# Patient Record
Sex: FEMALE | Race: WHITE | URBAN - METROPOLITAN AREA
[De-identification: names, ages, dates, MRNs, and addresses within clinical notes are randomized per-mention and may not be internally consistent; named-entity substitution may affect disease eponyms.]

---

## 2020-06-03 ENCOUNTER — OFFICE VISIT (OUTPATIENT)
Dept: URBAN - METROPOLITAN AREA CLINIC 98 | Facility: CLINIC | Age: 50
End: 2020-06-03
Payer: MEDICARE

## 2020-06-03 DIAGNOSIS — R10.84 ABDOMINAL PAIN, CHRONIC, GENERALIZED: ICD-10-CM

## 2020-06-03 DIAGNOSIS — K21.9 GERD (GASTROESOPHAGEAL REFLUX DISEASE): ICD-10-CM

## 2020-06-03 PROCEDURE — 99214 OFFICE O/P EST MOD 30 MIN: CPT | Performed by: INTERNAL MEDICINE

## 2020-06-03 PROCEDURE — G9903 PT SCRN TBCO ID AS NON USER: HCPCS | Performed by: INTERNAL MEDICINE

## 2020-06-03 PROCEDURE — G8420 CALC BMI NORM PARAMETERS: HCPCS | Performed by: INTERNAL MEDICINE

## 2020-06-03 PROCEDURE — 1036F TOBACCO NON-USER: CPT | Performed by: INTERNAL MEDICINE

## 2020-06-03 PROCEDURE — G8427 DOCREV CUR MEDS BY ELIG CLIN: HCPCS | Performed by: INTERNAL MEDICINE

## 2020-06-03 RX ORDER — ESOMEPRAZOLE MAGNESIUM 40 MG
CAPSULE,DELAYED RELEASE (ENTERIC COATED) ORAL
Qty: 0 | Refills: 0 | Status: ACTIVE | COMMUNITY
Start: 1900-01-01 | End: 1900-01-01

## 2020-06-03 RX ORDER — TIZANIDINE 4 MG/1
TAKE 1 TABLET BY MOUTH FOUR TIMES A DAY TABLET ORAL
Qty: 100 | Refills: 3 | Status: ACTIVE | COMMUNITY
Start: 2019-06-27 | End: 1900-01-01

## 2020-06-03 RX ORDER — TRAZODONE HYDROCHLORIDE 50 MG/1
TAKE 1 TABLET BY MOUTH AT BEDTIME TABLET ORAL
Qty: 30 | Refills: 2 | Status: ACTIVE | COMMUNITY
Start: 2019-06-27 | End: 1900-01-01

## 2020-06-03 RX ORDER — PROMETHAZINE HYDROCHLORIDE 50 MG/1
TAKE 1 TABLET BY MOUTH THREE TIMES A DAY TABLET ORAL
Qty: 90 | Refills: 4 | Status: ACTIVE | COMMUNITY
Start: 2018-12-16 | End: 1900-01-01

## 2020-06-03 RX ORDER — OXYCODONE HYDROCHLORIDE AND ACETAMINOPHEN 10; 325 MG/1; MG/1
TAKE 1 TABLET BY ORAL ROUTE EVERY 8 HOURS AS NEEDED TABLET ORAL
Qty: 40 | Refills: 0 | Status: ACTIVE | COMMUNITY
Start: 1900-01-01 | End: 1900-01-01

## 2020-06-03 RX ORDER — TIZANIDINE HYDROCHLORIDE 4 MG/1
TAKE 1 CAPSULE (4 MG) BY ORAL ROUTE TID PRN CAPSULE ORAL
Qty: 100 | Refills: 0 | Status: ACTIVE | COMMUNITY
Start: 1900-01-01 | End: 1900-01-01

## 2020-06-03 RX ORDER — IBUPROFEN 800 MG/1
TAKE 1 TABLET (800 MG) BY ORAL ROUTE 3 TIMES PER DAY WITH FOOD TABLET ORAL
Qty: 0 | Refills: 0 | Status: ACTIVE | COMMUNITY
Start: 1900-01-01 | End: 1900-01-01

## 2020-06-03 RX ORDER — EXTENDED PHENYTOIN SODIUM 30 MG/1
CAPSULE ORAL
Qty: 0 | Refills: 0 | Status: ACTIVE | COMMUNITY
Start: 1900-01-01 | End: 1900-01-01

## 2020-06-03 RX ORDER — ALPRAZOLAM 2 MG/1
1 PO TID PRN TABLET ORAL
Qty: 90 | Refills: 3 | Status: ACTIVE | COMMUNITY
Start: 1900-01-01 | End: 1900-01-01

## 2020-06-03 RX ORDER — ONDANSETRON HCL 8 MG
TAKE 1 TABLET BY ORAL ROUTE EVERY 6 HOURS TABLET ORAL
Qty: 20 | Refills: 0 | Status: ACTIVE | COMMUNITY
Start: 2019-07-03 | End: 1900-01-01

## 2020-06-03 NOTE — PHYSICAL EXAM GASTROINTESTINAL
Abdomen , softly distended, diffusely tender, no guarding or rigidity , no masses palpable , normal bowel sounds , Liver and Spleen no hepatosplenomegaly , liver nontender , spleen not palpable

## 2020-06-03 NOTE — PHYSICAL EXAM CHEST:
no lesions,  no deformities,  no traumatic injuries,  no significant scars are present,  chest wall non-tender,  no masses present,  tactile fremitus is normal, breathing is unlabored without accessory muscle use,normal breath sounds
no

## 2020-06-07 PROBLEM — 102614006 GENERALIZED ABDOMINAL PAIN: Status: ACTIVE | Noted: 2020-06-07

## 2020-06-26 ENCOUNTER — LAB OUTSIDE AN ENCOUNTER (OUTPATIENT)
Dept: URBAN - METROPOLITAN AREA CLINIC 98 | Facility: CLINIC | Age: 50
End: 2020-06-26

## 2020-08-07 ENCOUNTER — TELEPHONE ENCOUNTER (OUTPATIENT)
Dept: URBAN - METROPOLITAN AREA CLINIC 98 | Facility: CLINIC | Age: 50
End: 2020-08-07

## 2020-08-12 ENCOUNTER — OFFICE VISIT (OUTPATIENT)
Dept: URBAN - METROPOLITAN AREA CLINIC 98 | Facility: CLINIC | Age: 50
End: 2020-08-12
Payer: MEDICARE

## 2020-08-12 DIAGNOSIS — R10.9 ABDOMINAL PAIN OF MULTIPLE SITES: ICD-10-CM

## 2020-08-12 DIAGNOSIS — M54.9 CHRONIC BACK PAIN: ICD-10-CM

## 2020-08-12 DIAGNOSIS — K21.0 GASTROESOPHAGEAL REFLUX DISEASE (GERD): ICD-10-CM

## 2020-08-12 PROCEDURE — 1036F TOBACCO NON-USER: CPT | Performed by: INTERNAL MEDICINE

## 2020-08-12 PROCEDURE — G8427 DOCREV CUR MEDS BY ELIG CLIN: HCPCS | Performed by: INTERNAL MEDICINE

## 2020-08-12 PROCEDURE — 99214 OFFICE O/P EST MOD 30 MIN: CPT | Performed by: INTERNAL MEDICINE

## 2020-08-12 PROCEDURE — G9903 PT SCRN TBCO ID AS NON USER: HCPCS | Performed by: INTERNAL MEDICINE

## 2020-08-12 PROCEDURE — G8420 CALC BMI NORM PARAMETERS: HCPCS | Performed by: INTERNAL MEDICINE

## 2020-08-12 RX ORDER — TIZANIDINE HYDROCHLORIDE 4 MG/1
TAKE 1 CAPSULE (4 MG) BY ORAL ROUTE TID PRN CAPSULE ORAL
Qty: 100 | Refills: 0 | Status: ACTIVE | COMMUNITY
Start: 1900-01-01

## 2020-08-12 RX ORDER — ESOMEPRAZOLE MAGNESIUM 40 MG
CAPSULE,DELAYED RELEASE (ENTERIC COATED) ORAL
Qty: 0 | Refills: 0 | Status: ACTIVE | COMMUNITY
Start: 1900-01-01

## 2020-08-12 RX ORDER — PROMETHAZINE HYDROCHLORIDE 50 MG/1
TAKE 1 TABLET BY MOUTH THREE TIMES A DAY TABLET ORAL
Qty: 90 | Refills: 4 | Status: ACTIVE | COMMUNITY
Start: 2018-12-16

## 2020-08-12 RX ORDER — TRAZODONE HYDROCHLORIDE 50 MG/1
TAKE 1 TABLET BY MOUTH AT BEDTIME TABLET ORAL
Qty: 30 | Refills: 2 | Status: ACTIVE | COMMUNITY
Start: 2019-06-27

## 2020-08-12 RX ORDER — TIZANIDINE 4 MG/1
TAKE 1 TABLET BY MOUTH FOUR TIMES A DAY TABLET ORAL
Qty: 100 | Refills: 3 | Status: ACTIVE | COMMUNITY
Start: 2019-06-27

## 2020-08-12 RX ORDER — OXYCODONE HYDROCHLORIDE AND ACETAMINOPHEN 10; 325 MG/1; MG/1
TAKE 1 TABLET BY ORAL ROUTE EVERY 8 HOURS AS NEEDED TABLET ORAL
Qty: 40 | Refills: 0 | Status: ACTIVE | COMMUNITY
Start: 1900-01-01

## 2020-08-12 RX ORDER — ALPRAZOLAM 2 MG/1
1 PO TID PRN TABLET ORAL
Qty: 90 | Refills: 3 | Status: ACTIVE | COMMUNITY
Start: 1900-01-01

## 2020-08-12 RX ORDER — EXTENDED PHENYTOIN SODIUM 30 MG/1
CAPSULE ORAL
Qty: 0 | Refills: 0 | Status: ACTIVE | COMMUNITY
Start: 1900-01-01

## 2020-08-12 RX ORDER — ONDANSETRON HCL 8 MG
TAKE 1 TABLET BY ORAL ROUTE EVERY 6 HOURS TABLET ORAL
Qty: 20 | Refills: 0 | Status: ACTIVE | COMMUNITY
Start: 2019-07-03

## 2020-08-12 RX ORDER — IBUPROFEN 800 MG/1
TAKE 1 TABLET (800 MG) BY ORAL ROUTE 3 TIMES PER DAY WITH FOOD TABLET ORAL
Qty: 0 | Refills: 0 | Status: ACTIVE | COMMUNITY
Start: 1900-01-01

## 2020-08-12 NOTE — HPI-TODAY'S VISIT:
48 yo pt here for abdominal pain follow up. Still w/ rather constant p-prandial upper abdominal pain and distention despite eating small meals. feels nauseous w/o emesis. Intermittent WALE sxs controlled w/ diet and PPI. Normal bm's and no melenic stools nor hematochezia. At times, her abdominal pain seems to be localized in the RUQ. She has had no fever, chills nor cardiorespiratory sxs. Despite being compliant w/ diet and Rx, she has continued to experinece abdominal pain w/ distention No weight loss nor anorexia. Recent Abdominal US was normal. No other complaints after extensive ROS. She has been on chronic analgesics. She has chronic back pain due to extensive back surgeries with marked thoracolumbar lordosis - kyphosis. She has been compliant w/ 5 small meals a day and Rx. EGD 4/19: NERD, acute erosive Hp-negative alkaline reflux gastritis, WALE w/o BE and normal duodenal bx's.

## 2020-08-14 ENCOUNTER — TELEPHONE ENCOUNTER (OUTPATIENT)
Dept: URBAN - METROPOLITAN AREA CLINIC 98 | Facility: CLINIC | Age: 50
End: 2020-08-14

## 2020-08-21 ENCOUNTER — TELEPHONE ENCOUNTER (OUTPATIENT)
Dept: URBAN - METROPOLITAN AREA CLINIC 98 | Facility: CLINIC | Age: 50
End: 2020-08-21

## 2020-09-01 ENCOUNTER — LAB OUTSIDE AN ENCOUNTER (OUTPATIENT)
Dept: URBAN - METROPOLITAN AREA CLINIC 98 | Facility: CLINIC | Age: 50
End: 2020-09-01

## 2020-09-04 ENCOUNTER — TELEPHONE ENCOUNTER (OUTPATIENT)
Dept: URBAN - METROPOLITAN AREA CLINIC 98 | Facility: CLINIC | Age: 50
End: 2020-09-04

## 2020-10-05 ENCOUNTER — TELEPHONE ENCOUNTER (OUTPATIENT)
Dept: URBAN - METROPOLITAN AREA CLINIC 98 | Facility: CLINIC | Age: 50
End: 2020-10-05

## 2020-10-19 ENCOUNTER — TELEPHONE ENCOUNTER (OUTPATIENT)
Dept: URBAN - METROPOLITAN AREA CLINIC 98 | Facility: CLINIC | Age: 50
End: 2020-10-19

## 2020-10-19 RX ORDER — ALPRAZOLAM 1 MG/1
2 TABLET TABLET ORAL THREE TIMES A DAY
Qty: 180 TABLET | Refills: 0 | OUTPATIENT
Start: 2020-10-19

## 2020-11-02 ENCOUNTER — TELEPHONE ENCOUNTER (OUTPATIENT)
Dept: URBAN - METROPOLITAN AREA CLINIC 98 | Facility: CLINIC | Age: 50
End: 2020-11-02

## 2020-11-06 ENCOUNTER — OFFICE VISIT (OUTPATIENT)
Dept: URBAN - METROPOLITAN AREA TELEHEALTH 2 | Facility: TELEHEALTH | Age: 50
End: 2020-11-06
Payer: MEDICARE

## 2020-11-06 DIAGNOSIS — R10.84 ABDOMINAL CRAMPING, GENERALIZED: ICD-10-CM

## 2020-11-06 DIAGNOSIS — M54.9 CHRONIC BACK PAIN GREATER THAN 3 MONTHS DURATION: ICD-10-CM

## 2020-11-06 DIAGNOSIS — K21.9 GERD (GASTROESOPHAGEAL REFLUX DISEASE): ICD-10-CM

## 2020-11-06 PROCEDURE — G8420 CALC BMI NORM PARAMETERS: HCPCS | Performed by: INTERNAL MEDICINE

## 2020-11-06 PROCEDURE — 99213 OFFICE O/P EST LOW 20 MIN: CPT | Performed by: INTERNAL MEDICINE

## 2020-11-06 PROCEDURE — G8427 DOCREV CUR MEDS BY ELIG CLIN: HCPCS | Performed by: INTERNAL MEDICINE

## 2020-11-06 PROCEDURE — G8484 FLU IMMUNIZE NO ADMIN: HCPCS | Performed by: INTERNAL MEDICINE

## 2020-11-06 PROCEDURE — 1036F TOBACCO NON-USER: CPT | Performed by: INTERNAL MEDICINE

## 2020-11-06 PROCEDURE — G9903 PT SCRN TBCO ID AS NON USER: HCPCS | Performed by: INTERNAL MEDICINE

## 2020-11-06 RX ORDER — EXTENDED PHENYTOIN SODIUM 30 MG/1
CAPSULE ORAL
Qty: 0 | Refills: 0 | Status: ACTIVE | COMMUNITY
Start: 1900-01-01

## 2020-11-06 RX ORDER — PROMETHAZINE HYDROCHLORIDE 50 MG/1
TAKE 1 TABLET BY MOUTH THREE TIMES A DAY TABLET ORAL
Qty: 90 | Refills: 4 | Status: ACTIVE | COMMUNITY
Start: 2018-12-16

## 2020-11-06 RX ORDER — TIZANIDINE 4 MG/1
TAKE 1 TABLET BY MOUTH FOUR TIMES A DAY TABLET ORAL
Qty: 100 | Refills: 3 | Status: ACTIVE | COMMUNITY
Start: 2019-06-27

## 2020-11-06 RX ORDER — TIZANIDINE HYDROCHLORIDE 4 MG/1
TAKE 1 CAPSULE (4 MG) BY ORAL ROUTE TID PRN CAPSULE ORAL
Qty: 100 | Refills: 0 | Status: ACTIVE | COMMUNITY
Start: 1900-01-01

## 2020-11-06 RX ORDER — OXYCODONE HYDROCHLORIDE AND ACETAMINOPHEN 10; 325 MG/1; MG/1
TAKE 1 TABLET BY ORAL ROUTE EVERY 8 HOURS AS NEEDED TABLET ORAL
Qty: 40 | Refills: 0 | Status: ACTIVE | COMMUNITY
Start: 1900-01-01

## 2020-11-06 RX ORDER — ESOMEPRAZOLE MAGNESIUM 40 MG
CAPSULE,DELAYED RELEASE (ENTERIC COATED) ORAL
Qty: 0 | Refills: 0 | Status: ACTIVE | COMMUNITY
Start: 1900-01-01

## 2020-11-06 RX ORDER — TRAZODONE HYDROCHLORIDE 50 MG/1
TAKE 1 TABLET BY MOUTH AT BEDTIME TABLET ORAL
Qty: 30 | Refills: 2 | Status: ACTIVE | COMMUNITY
Start: 2019-06-27

## 2020-11-06 RX ORDER — ALPRAZOLAM 1 MG/1
2 TABLET TABLET ORAL THREE TIMES A DAY
Qty: 180 TABLET | Refills: 0 | Status: ACTIVE | COMMUNITY
Start: 2020-10-19

## 2020-11-06 RX ORDER — ONDANSETRON HCL 8 MG
TAKE 1 TABLET BY ORAL ROUTE EVERY 6 HOURS TABLET ORAL
Qty: 20 | Refills: 0 | Status: ACTIVE | COMMUNITY
Start: 2019-07-03

## 2020-11-06 RX ORDER — IBUPROFEN 800 MG/1
TAKE 1 TABLET (800 MG) BY ORAL ROUTE 3 TIMES PER DAY WITH FOOD TABLET ORAL
Qty: 0 | Refills: 0 | Status: ACTIVE | COMMUNITY
Start: 1900-01-01

## 2020-11-06 RX ORDER — ALPRAZOLAM 2 MG/1
1 PO TID PRN TABLET ORAL
Qty: 90 | Refills: 3 | Status: ACTIVE | COMMUNITY
Start: 1900-01-01

## 2020-11-06 NOTE — HPI-TODAY'S VISIT:
This is a Telephone OV to which patient has agreed to. 48 yo patient for abdominal pain f/u. She has been experiencing constant p-prandial abdominal fullness and early satiety w/ constant nausea w/o emesis. Unable to eat much due to her abdominal pain. She has normal bm's w/o melenic stools nor hematochezia. She has chronic back pain due to multiple back surgeries and marked TS kyphosis and lumbar scoliosis. Has been seen by Ortho and suggested a back brace; not a candidate for additional surgeries. She has been feeling fatigued and tired due to her chronic pain. No fever or chills. No cardiorespiratory sxs. Recent labs all normal. Normal A + P CT scan and CXR. No other complaints.

## 2020-11-07 PROBLEM — 235595009 GASTROESOPHAGEAL REFLUX DISEASE: Status: ACTIVE | Noted: 2020-11-07

## 2020-11-30 ENCOUNTER — TELEPHONE ENCOUNTER (OUTPATIENT)
Dept: URBAN - METROPOLITAN AREA CLINIC 98 | Facility: CLINIC | Age: 50
End: 2020-11-30

## 2020-12-29 ENCOUNTER — TELEPHONE ENCOUNTER (OUTPATIENT)
Dept: URBAN - METROPOLITAN AREA CLINIC 98 | Facility: CLINIC | Age: 50
End: 2020-12-29

## 2021-03-02 ENCOUNTER — ERX REFILL RESPONSE (OUTPATIENT)
Dept: URBAN - METROPOLITAN AREA CLINIC 98 | Facility: CLINIC | Age: 51
End: 2021-03-02

## 2021-03-02 RX ORDER — TIZANIDINE 4 MG/1
TAKE 1 TABLET BY MOUTH FOUR TIMES DAILY TABLET ORAL
Qty: 100 | Refills: 0

## 2021-05-04 ENCOUNTER — TELEPHONE ENCOUNTER (OUTPATIENT)
Dept: URBAN - METROPOLITAN AREA SURGERY CENTER 30 | Facility: SURGERY CENTER | Age: 51
End: 2021-05-04

## 2021-05-11 ENCOUNTER — TELEPHONE ENCOUNTER (OUTPATIENT)
Dept: URBAN - METROPOLITAN AREA CLINIC 98 | Facility: CLINIC | Age: 51
End: 2021-05-11

## 2021-05-18 ENCOUNTER — OFFICE VISIT (OUTPATIENT)
Dept: URBAN - METROPOLITAN AREA CLINIC 98 | Facility: CLINIC | Age: 51
End: 2021-05-18
Payer: MEDICARE

## 2021-05-18 DIAGNOSIS — R10.13 EPIGASTRIC PAIN: ICD-10-CM

## 2021-05-18 DIAGNOSIS — R13.14 PHARYNGOESOPHAGEAL DYSPHAGIA: ICD-10-CM

## 2021-05-18 PROCEDURE — 99214 OFFICE O/P EST MOD 30 MIN: CPT | Performed by: INTERNAL MEDICINE

## 2021-05-18 RX ORDER — ONDANSETRON HCL 8 MG
TAKE 1 TABLET BY ORAL ROUTE EVERY 6 HOURS TABLET ORAL
Qty: 20 | Refills: 0 | Status: ACTIVE | COMMUNITY
Start: 2019-07-03

## 2021-05-18 RX ORDER — OXYCODONE HYDROCHLORIDE AND ACETAMINOPHEN 10; 325 MG/1; MG/1
TAKE 1 TABLET BY ORAL ROUTE EVERY 8 HOURS AS NEEDED TABLET ORAL
Qty: 40 | Refills: 0 | Status: ACTIVE | COMMUNITY
Start: 1900-01-01

## 2021-05-18 RX ORDER — TRAZODONE HYDROCHLORIDE 50 MG/1
TAKE 1 TABLET BY MOUTH AT BEDTIME TABLET ORAL
Qty: 30 | Refills: 2 | Status: ACTIVE | COMMUNITY
Start: 2019-06-27

## 2021-05-18 RX ORDER — TIZANIDINE 4 MG/1
TAKE 1 TABLET BY MOUTH FOUR TIMES DAILY TABLET ORAL
Qty: 100 | Refills: 0 | Status: ACTIVE | COMMUNITY

## 2021-05-18 RX ORDER — ALPRAZOLAM 1 MG/1
2 TABLET TABLET ORAL THREE TIMES A DAY
Qty: 180 TABLET | Refills: 0 | Status: ACTIVE | COMMUNITY
Start: 2020-10-19

## 2021-05-18 RX ORDER — TIZANIDINE HYDROCHLORIDE 4 MG/1
TAKE 1 CAPSULE (4 MG) BY ORAL ROUTE TID PRN CAPSULE ORAL
Qty: 100 | Refills: 0 | Status: ACTIVE | COMMUNITY
Start: 1900-01-01

## 2021-05-18 RX ORDER — IBUPROFEN 800 MG/1
TAKE 1 TABLET (800 MG) BY ORAL ROUTE 3 TIMES PER DAY WITH FOOD TABLET ORAL
Qty: 0 | Refills: 0 | Status: ACTIVE | COMMUNITY
Start: 1900-01-01

## 2021-05-18 RX ORDER — PROMETHAZINE HYDROCHLORIDE 50 MG/1
TAKE 1 TABLET BY MOUTH THREE TIMES A DAY TABLET ORAL
Qty: 90 | Refills: 4 | Status: ACTIVE | COMMUNITY
Start: 2018-12-16

## 2021-05-18 RX ORDER — ESOMEPRAZOLE MAGNESIUM 40 MG
CAPSULE,DELAYED RELEASE (ENTERIC COATED) ORAL
Qty: 0 | Refills: 0 | Status: ACTIVE | COMMUNITY
Start: 1900-01-01

## 2021-05-18 RX ORDER — EXTENDED PHENYTOIN SODIUM 30 MG/1
CAPSULE ORAL
Qty: 0 | Refills: 0 | Status: ACTIVE | COMMUNITY
Start: 1900-01-01

## 2021-05-18 RX ORDER — ALPRAZOLAM 2 MG/1
1 PO TID PRN TABLET ORAL
Qty: 90 | Refills: 3 | Status: ACTIVE | COMMUNITY
Start: 1900-01-01

## 2021-05-18 NOTE — HPI-TODAY'S VISIT:
50 yo patient for abdominal pain f/u. She has been experiencing constant p-prandial abdominal fullness and early satiety w/ constant nausea w/o emesis. Unable to eat much due to her abdominal pain. She has been having some intermittent dysphagia to solids w/o food impaction. She has normal bm's w/o melenic stools nor hematochezia. She has chronic back pain due to multiple back surgeries and marked TS kyphosis and lumbar scoliosis. Has been seen by Ortho and suggested a back brace; not a candidate for additional surgeries. She has been feeling fatigued and tired due to her chronic pain. No fever or chills. No cardiorespiratory sxs. Recent labs all normal. Normal A + P CT scan and CXR. No other complaints.

## 2021-06-17 ENCOUNTER — OFFICE VISIT (OUTPATIENT)
Dept: URBAN - METROPOLITAN AREA SURGERY CENTER 18 | Facility: SURGERY CENTER | Age: 51
End: 2021-06-17

## 2021-06-17 ENCOUNTER — TELEPHONE ENCOUNTER (OUTPATIENT)
Dept: URBAN - METROPOLITAN AREA CLINIC 98 | Facility: CLINIC | Age: 51
End: 2021-06-17

## 2021-06-17 RX ORDER — TIZANIDINE 4 MG/1
TAKE 1 TABLET BY MOUTH FOUR TIMES DAILY TABLET ORAL
Qty: 100 | Refills: 0

## 2021-06-18 ENCOUNTER — ERX REFILL RESPONSE (OUTPATIENT)
Dept: URBAN - METROPOLITAN AREA CLINIC 98 | Facility: CLINIC | Age: 51
End: 2021-06-18

## 2021-06-18 RX ORDER — TIZANIDINE 4 MG/1
TAKE 1 TABLET BY MOUTH FOUR TIMES DAILY TABLET ORAL
Qty: 100 | Refills: 0

## 2021-07-12 ENCOUNTER — TELEPHONE ENCOUNTER (OUTPATIENT)
Dept: URBAN - METROPOLITAN AREA CLINIC 98 | Facility: CLINIC | Age: 51
End: 2021-07-12

## 2021-07-12 RX ORDER — TRAZODONE HYDROCHLORIDE 50 MG/1
1 TABLET AT BEDTIME AS NEEDED TABLET, FILM COATED ORAL THREE TIMES A DAY
Qty: 90 TABLET | Refills: 3 | OUTPATIENT
Start: 2021-07-26

## 2021-07-12 RX ORDER — CARISOPRODOL 350 MG/1
1 TABLET AS NEEDED TABLET ORAL THREE TIMES A DAY
Qty: 90 TABLET | Refills: 3 | OUTPATIENT
Start: 2021-07-26 | End: 2021-11-22

## 2021-07-12 RX ORDER — PROMETHAZINE HYDROCHLORIDE 50 MG/1
1 TABLET AS NEEDED TABLET ORAL THREE TIMES A DAY
Qty: 90 TABLET | Refills: 3 | OUTPATIENT
Start: 2021-07-26 | End: 2021-11-22

## 2021-07-12 RX ORDER — ALPRAZOLAM 2 MG/1
1 TABLET TABLET ORAL THREE TIMES A DAY
Qty: 90 TABLET | Refills: 3
Start: 2020-10-19

## 2021-07-12 RX ORDER — TEMAZEPAM 30 MG/1
1 CAPSULE AT BEDTIME AS NEEDED CAPSULE ORAL ONCE A DAY
Qty: 30 CAPSULE | Refills: 3 | OUTPATIENT
Start: 2021-07-26

## 2021-07-13 ENCOUNTER — OFFICE VISIT (OUTPATIENT)
Dept: URBAN - METROPOLITAN AREA SURGERY CENTER 18 | Facility: SURGERY CENTER | Age: 51
End: 2021-07-13

## 2021-08-17 ENCOUNTER — ERX REFILL RESPONSE (OUTPATIENT)
Dept: URBAN - METROPOLITAN AREA CLINIC 98 | Facility: CLINIC | Age: 51
End: 2021-08-17

## 2021-08-17 RX ORDER — TIZANIDINE 4 MG/1
TAKE 1 TABLET BY MOUTH FOUR TIMES DAILY TABLET ORAL
Qty: 100 | Refills: 0 | OUTPATIENT

## 2021-08-17 RX ORDER — TIZANIDINE 4 MG/1
TAKE 1 TABLET BY MOUTH FOUR TIMES DAILY TABLET ORAL
Qty: 100 TABLET | Refills: 1 | OUTPATIENT

## 2021-09-14 ENCOUNTER — TELEPHONE ENCOUNTER (OUTPATIENT)
Dept: URBAN - METROPOLITAN AREA CLINIC 98 | Facility: CLINIC | Age: 51
End: 2021-09-14

## 2021-09-14 RX ORDER — TRAZODONE HYDROCHLORIDE 50 MG/1
1 TABLET AT BEDTIME AS NEEDED TABLET, FILM COATED ORAL THREE TIMES A DAY
Qty: 90 TABLET | Refills: 3
Start: 2021-07-26

## 2021-09-14 RX ORDER — CARISOPRODOL 350 MG/1
1 TABLET AS NEEDED TABLET ORAL THREE TIMES A DAY
Qty: 90 TABLET | Refills: 3
Start: 2021-07-26 | End: 2022-01-12

## 2021-09-14 RX ORDER — TIZANIDINE 4 MG/1
TAKE 1 TABLET BY MOUTH FOUR TIMES DAILY TABLET ORAL
Qty: 100 TABLET | Refills: 1

## 2021-09-14 RX ORDER — PROMETHAZINE HYDROCHLORIDE 50 MG/1
1 TABLET AS NEEDED TABLET ORAL THREE TIMES A DAY
Qty: 90 TABLET | Refills: 3
Start: 2021-07-26 | End: 2022-01-12

## 2021-09-14 RX ORDER — TEMAZEPAM 30 MG/1
1 CAPSULE AT BEDTIME AS NEEDED CAPSULE ORAL ONCE A DAY
Qty: 30 CAPSULE | Refills: 3
Start: 2021-07-26

## 2021-09-15 ENCOUNTER — ERX REFILL RESPONSE (OUTPATIENT)
Dept: URBAN - METROPOLITAN AREA CLINIC 98 | Facility: CLINIC | Age: 51
End: 2021-09-15

## 2021-09-15 RX ORDER — TIZANIDINE 4 MG/1
TAKE 1 TABLET BY MOUTH FOUR TIMES DAILY TABLET ORAL
Qty: 100 TABLET | Refills: 1 | OUTPATIENT

## 2021-09-15 RX ORDER — TIZANIDINE 4 MG/1
TAKE 1 TABLET BY MOUTH FOUR TIMES DAILY TABLET ORAL
Qty: 100 TABLET | Refills: 0 | OUTPATIENT

## 2021-09-23 ENCOUNTER — TELEPHONE ENCOUNTER (OUTPATIENT)
Dept: URBAN - METROPOLITAN AREA CLINIC 98 | Facility: CLINIC | Age: 51
End: 2021-09-23

## 2021-09-23 RX ORDER — BACLOFEN 5 MG/1
1 TABLET AS NEEDED TABLET ORAL
Qty: 120 TABLET | Refills: 3 | OUTPATIENT
Start: 2021-09-23 | End: 2022-01-20

## 2022-02-25 ENCOUNTER — TELEPHONE ENCOUNTER (OUTPATIENT)
Dept: URBAN - METROPOLITAN AREA CLINIC 98 | Facility: CLINIC | Age: 52
End: 2022-02-25

## 2022-02-28 ENCOUNTER — TELEPHONE ENCOUNTER (OUTPATIENT)
Dept: URBAN - METROPOLITAN AREA CLINIC 98 | Facility: CLINIC | Age: 52
End: 2022-02-28

## 2022-02-28 RX ORDER — CARISOPRODOL 350 MG/1
1 TABLET AS NEEDED TABLET ORAL THREE TIMES A DAY
Qty: 90 TABLET | Refills: 3
Start: 2021-07-26 | End: 2022-06-28

## 2022-02-28 RX ORDER — TIZANIDINE HYDROCHLORIDE 4 MG/1
TAKE 1 CAPSULE (4 MG) BY ORAL ROUTE TID PRN CAPSULE ORAL
Qty: 100 | Refills: 0

## 2022-02-28 RX ORDER — ALPRAZOLAM 2 MG/1
1 TABLET TABLET ORAL THREE TIMES A DAY
Qty: 90 TABLET | Refills: 3
Start: 2020-10-19

## 2022-02-28 RX ORDER — TIZANIDINE 4 MG/1
TAKE 1 TABLET BY MOUTH FOUR TIMES DAILY TABLET ORAL
Qty: 100 TABLET | Refills: 1

## 2022-02-28 RX ORDER — PROMETHAZINE HYDROCHLORIDE 50 MG/1
1 TABLET AS NEEDED TABLET ORAL THREE TIMES A DAY
Qty: 90 TABLET | Refills: 3
Start: 2021-07-26 | End: 2022-06-28

## 2022-02-28 RX ORDER — TRAZODONE HYDROCHLORIDE 50 MG/1
1 TABLET AT BEDTIME AS NEEDED TABLET, FILM COATED ORAL THREE TIMES A DAY
Qty: 90 TABLET | Refills: 3
Start: 2021-07-26

## 2022-02-28 RX ORDER — TEMAZEPAM 30 MG/1
1 CAPSULE AT BEDTIME AS NEEDED CAPSULE ORAL ONCE A DAY
Qty: 30 CAPSULE | Refills: 3
Start: 2021-07-26

## 2022-04-25 ENCOUNTER — TELEPHONE ENCOUNTER (OUTPATIENT)
Dept: URBAN - METROPOLITAN AREA CLINIC 98 | Facility: CLINIC | Age: 52
End: 2022-04-25

## 2022-04-25 ENCOUNTER — ERX REFILL RESPONSE (OUTPATIENT)
Dept: URBAN - METROPOLITAN AREA CLINIC 98 | Facility: CLINIC | Age: 52
End: 2022-04-25

## 2022-04-25 RX ORDER — TIZANIDINE 4 MG/1
TAKE 1 TABLET BY MOUTH FOUR TIMES DAILY TABLET ORAL
Qty: 100 TABLET | Refills: 1 | OUTPATIENT

## 2022-04-25 RX ORDER — TIZANIDINE 4 MG/1
TAKE 1 TABLET BY MOUTH FOUR TIMES DAILY TABLET ORAL
Qty: 100 TABLET | Refills: 1

## 2022-06-13 ENCOUNTER — ERX REFILL RESPONSE (OUTPATIENT)
Dept: URBAN - METROPOLITAN AREA CLINIC 98 | Facility: CLINIC | Age: 52
End: 2022-06-13

## 2022-06-13 RX ORDER — TIZANIDINE 4 MG/1
TAKE 1 TABLET BY MOUTH FOUR TIMES DAILY TABLET ORAL
Qty: 100 TABLET | Refills: 0 | OUTPATIENT

## 2022-06-22 ENCOUNTER — TELEPHONE ENCOUNTER (OUTPATIENT)
Dept: URBAN - METROPOLITAN AREA CLINIC 98 | Facility: CLINIC | Age: 52
End: 2022-06-22

## 2022-06-22 RX ORDER — PROMETHAZINE HYDROCHLORIDE 50 MG/1
1 TABLET AS NEEDED TABLET ORAL THREE TIMES A DAY
Qty: 90 TABLET | Refills: 3
Start: 2021-07-26 | End: 2022-10-20

## 2022-06-22 RX ORDER — TRAZODONE HYDROCHLORIDE 50 MG/1
1 TABLET AT BEDTIME AS NEEDED TABLET, FILM COATED ORAL THREE TIMES A DAY
Qty: 90 TABLET | Refills: 3
Start: 2021-07-26

## 2022-06-27 ENCOUNTER — OFFICE VISIT (OUTPATIENT)
Dept: URBAN - METROPOLITAN AREA TELEHEALTH 2 | Facility: TELEHEALTH | Age: 52
End: 2022-06-27
Payer: MEDICARE

## 2022-06-27 DIAGNOSIS — R13.14 PHARYNGOESOPHAGEAL DYSPHAGIA: ICD-10-CM

## 2022-06-27 DIAGNOSIS — R10.13 EPIGASTRIC PAIN: ICD-10-CM

## 2022-06-27 PROCEDURE — 99214 OFFICE O/P EST MOD 30 MIN: CPT | Performed by: INTERNAL MEDICINE

## 2022-06-27 RX ORDER — EXTENDED PHENYTOIN SODIUM 30 MG/1
CAPSULE ORAL
Qty: 0 | Refills: 0 | Status: ACTIVE | COMMUNITY
Start: 1900-01-01

## 2022-06-27 RX ORDER — TRAZODONE HYDROCHLORIDE 50 MG/1
1 TABLET AT BEDTIME AS NEEDED TABLET, FILM COATED ORAL THREE TIMES A DAY
Qty: 90 TABLET | Refills: 3 | Status: ACTIVE | COMMUNITY
Start: 2021-07-26

## 2022-06-27 RX ORDER — ONDANSETRON HCL 8 MG
TAKE 1 TABLET BY ORAL ROUTE EVERY 6 HOURS TABLET ORAL
Qty: 20 | Refills: 0 | Status: ACTIVE | COMMUNITY
Start: 2019-07-03

## 2022-06-27 RX ORDER — IBUPROFEN 800 MG/1
TAKE 1 TABLET (800 MG) BY ORAL ROUTE 3 TIMES PER DAY WITH FOOD TABLET ORAL
Qty: 0 | Refills: 0 | Status: ACTIVE | COMMUNITY
Start: 1900-01-01

## 2022-06-27 RX ORDER — OXYCODONE HYDROCHLORIDE AND ACETAMINOPHEN 10; 325 MG/1; MG/1
TAKE 1 TABLET BY ORAL ROUTE EVERY 8 HOURS AS NEEDED TABLET ORAL
Qty: 40 | Refills: 0 | Status: ACTIVE | COMMUNITY
Start: 1900-01-01

## 2022-06-27 RX ORDER — ESOMEPRAZOLE MAGNESIUM 40 MG
CAPSULE,DELAYED RELEASE (ENTERIC COATED) ORAL
Qty: 0 | Refills: 0 | Status: ACTIVE | COMMUNITY
Start: 1900-01-01

## 2022-06-27 RX ORDER — CARISOPRODOL 350 MG/1
1 TABLET AS NEEDED TABLET ORAL THREE TIMES A DAY
Qty: 90 TABLET | Refills: 3 | Status: ACTIVE | COMMUNITY
Start: 2021-07-26 | End: 2022-06-28

## 2022-06-27 RX ORDER — ALPRAZOLAM 2 MG/1
1 PO TID PRN TABLET ORAL
Qty: 90 | Refills: 3 | Status: ACTIVE | COMMUNITY
Start: 1900-01-01

## 2022-06-27 RX ORDER — PROMETHAZINE HYDROCHLORIDE 50 MG/1
TAKE 1 TABLET BY MOUTH THREE TIMES A DAY TABLET ORAL
Qty: 90 | Refills: 4 | Status: ACTIVE | COMMUNITY
Start: 2018-12-16

## 2022-06-27 RX ORDER — TIZANIDINE HYDROCHLORIDE 4 MG/1
TAKE 1 CAPSULE (4 MG) BY ORAL ROUTE TID PRN CAPSULE ORAL
Qty: 100 | Refills: 0 | Status: ACTIVE | COMMUNITY

## 2022-06-27 RX ORDER — TIZANIDINE 4 MG/1
TAKE 1 TABLET BY MOUTH FOUR TIMES DAILY TABLET ORAL
Qty: 100 TABLET | Refills: 0 | Status: ACTIVE | COMMUNITY

## 2022-06-27 RX ORDER — TEMAZEPAM 30 MG/1
1 CAPSULE AT BEDTIME AS NEEDED CAPSULE ORAL ONCE A DAY
Qty: 30 CAPSULE | Refills: 3 | Status: ACTIVE | COMMUNITY
Start: 2021-07-26

## 2022-06-27 RX ORDER — ALPRAZOLAM 2 MG/1
1 TABLET TABLET ORAL THREE TIMES A DAY
Qty: 90 TABLET | Refills: 3 | Status: ACTIVE | COMMUNITY
Start: 2020-10-19

## 2022-06-27 RX ORDER — TRAZODONE HYDROCHLORIDE 50 MG/1
TAKE 1 TABLET BY MOUTH AT BEDTIME TABLET ORAL
Qty: 30 | Refills: 2 | Status: ACTIVE | COMMUNITY
Start: 2019-06-27

## 2022-06-27 RX ORDER — PROMETHAZINE HYDROCHLORIDE 50 MG/1
1 TABLET AS NEEDED TABLET ORAL THREE TIMES A DAY
Qty: 90 TABLET | Refills: 3 | Status: ACTIVE | COMMUNITY
Start: 2021-07-26 | End: 2022-10-20

## 2022-06-27 NOTE — HPI-TODAY'S VISIT:
This is a telephone OV to which patient has agreed to. Limitations of telehealth discussed; she understands and agrees to proceed. Patient's @ home during this virtual OV. 52 yo patient for abdominal pain f/u. She has been experiencing constant p-prandial abdominal fullness, epigastric discomfort, and early satiety w/ constant nausea w/o emesis. Occasional regurgitation w burping. Lately, she has had more nausea and some difficulty swallowing her  po medications. No food impaction. NO cardiorespiratory sxs.  Unable to eat much due to her abdominal pain. EGD 4/19: acute erosive gas tritis, bile reflux gastritis and WALE wo BE. Has been on prn Famotidine. On no PPI currently.  She has normal bm's w/o melenic stools nor hematochezia. She has chronic back pain due to multiple back surgeries and marked TS kyphosis and lumbar scoliosis. Has been seen by Ortho and suggested a back brace; not a candidate for additional surgeries. She has been feeling fatigued and tired due to her chronic pain. No fever or chills. No cardiorespiratory sxs. No recent labs.  No COVID-19 exposure and has received 2 doses of COVID-19 vaccine. No other complaints.

## 2022-07-14 PROBLEM — 40739000: Status: ACTIVE | Noted: 2021-05-18

## 2022-08-04 ENCOUNTER — CLAIMS CREATED FROM THE CLAIM WINDOW (OUTPATIENT)
Dept: URBAN - METROPOLITAN AREA CLINIC 4 | Facility: CLINIC | Age: 52
End: 2022-08-04
Payer: MEDICARE

## 2022-08-04 ENCOUNTER — OFFICE VISIT (OUTPATIENT)
Dept: URBAN - METROPOLITAN AREA SURGERY CENTER 18 | Facility: SURGERY CENTER | Age: 52
End: 2022-08-04
Payer: MEDICARE

## 2022-08-04 DIAGNOSIS — K21.9 GASTRO-ESOPHAGEAL REFLUX DISEASE WITHOUT ESOPHAGITIS: ICD-10-CM

## 2022-08-04 DIAGNOSIS — K31.89 OTHER DISEASES OF STOMACH AND DUODENUM: ICD-10-CM

## 2022-08-04 DIAGNOSIS — K21.9 ACID REFLUX: ICD-10-CM

## 2022-08-04 DIAGNOSIS — K29.70 GASTRITIS, UNSPECIFIED, WITHOUT BLEEDING: ICD-10-CM

## 2022-08-04 DIAGNOSIS — K31.89 ACQUIRED DEFORMITY OF DUODENUM: ICD-10-CM

## 2022-08-04 PROCEDURE — G8907 PT DOC NO EVENTS ON DISCHARG: HCPCS | Performed by: INTERNAL MEDICINE

## 2022-08-04 PROCEDURE — 43239 EGD BIOPSY SINGLE/MULTIPLE: CPT | Performed by: INTERNAL MEDICINE

## 2022-08-04 PROCEDURE — 88312 SPECIAL STAINS GROUP 1: CPT | Performed by: PATHOLOGY

## 2022-08-04 PROCEDURE — 88305 TISSUE EXAM BY PATHOLOGIST: CPT | Performed by: PATHOLOGY

## 2022-08-15 ENCOUNTER — OFFICE VISIT (OUTPATIENT)
Dept: URBAN - METROPOLITAN AREA TELEHEALTH 2 | Facility: TELEHEALTH | Age: 52
End: 2022-08-15
Payer: MEDICARE

## 2022-08-15 DIAGNOSIS — Z12.11 COLON CANCER SCREENING: ICD-10-CM

## 2022-08-15 PROCEDURE — 993 APS NON BILLABLE: Performed by: INTERNAL MEDICINE

## 2022-08-29 ENCOUNTER — TELEPHONE ENCOUNTER (OUTPATIENT)
Dept: URBAN - METROPOLITAN AREA CLINIC 6 | Facility: CLINIC | Age: 52
End: 2022-08-29

## 2022-10-11 ENCOUNTER — TELEPHONE ENCOUNTER (OUTPATIENT)
Dept: URBAN - METROPOLITAN AREA CLINIC 98 | Facility: CLINIC | Age: 52
End: 2022-10-11

## 2022-10-11 ENCOUNTER — ERX REFILL RESPONSE (OUTPATIENT)
Dept: URBAN - METROPOLITAN AREA CLINIC 98 | Facility: CLINIC | Age: 52
End: 2022-10-11

## 2022-10-11 RX ORDER — TIZANIDINE 4 MG/1
TAKE 1 TABLET BY MOUTH THREE TIMES DAILY AS NEEDED TABLET ORAL
Qty: 100 TABLET | Refills: 0 | OUTPATIENT

## 2022-10-11 RX ORDER — TEMAZEPAM 30 MG/1
1 CAPSULE AT BEDTIME AS NEEDED CAPSULE ORAL ONCE A DAY
Qty: 30 CAPSULE | Refills: 3
Start: 2021-07-26

## 2022-10-11 RX ORDER — ALPRAZOLAM 2 MG/1
1 PO TID PRN TABLET ORAL
Qty: 90 | Refills: 3

## 2022-10-11 RX ORDER — TIZANIDINE 4 MG/1
TAKE 1 TABLET BY MOUTH FOUR TIMES DAILY TABLET ORAL
Qty: 100 TABLET | Refills: 0

## 2022-10-11 RX ORDER — PROMETHAZINE HYDROCHLORIDE 50 MG/1
TAKE 1 TABLET BY MOUTH THREE TIMES A DAY TABLET ORAL
Qty: 90 | Refills: 4
Start: 2018-12-16

## 2022-10-24 ENCOUNTER — TELEPHONE ENCOUNTER (OUTPATIENT)
Dept: URBAN - METROPOLITAN AREA CLINIC 6 | Facility: CLINIC | Age: 52
End: 2022-10-24

## 2022-11-09 ENCOUNTER — TELEPHONE ENCOUNTER (OUTPATIENT)
Dept: URBAN - METROPOLITAN AREA CLINIC 98 | Facility: CLINIC | Age: 52
End: 2022-11-09

## 2022-11-09 RX ORDER — ALPRAZOLAM 2 MG/1
1 PO TID PRN TABLET ORAL
Qty: 90 | Refills: 3

## 2022-11-23 ENCOUNTER — OFFICE VISIT (OUTPATIENT)
Dept: URBAN - METROPOLITAN AREA CLINIC 98 | Facility: CLINIC | Age: 52
End: 2022-11-23
Payer: MEDICARE

## 2022-11-23 VITALS
HEIGHT: 60 IN | TEMPERATURE: 97.1 F | HEART RATE: 79 BPM | WEIGHT: 152.6 LBS | SYSTOLIC BLOOD PRESSURE: 104 MMHG | BODY MASS INDEX: 29.96 KG/M2 | DIASTOLIC BLOOD PRESSURE: 67 MMHG

## 2022-11-23 DIAGNOSIS — G89.29 OTHER CHRONIC PAIN: ICD-10-CM

## 2022-11-23 DIAGNOSIS — R10.84 GENERALIZED ABDOMINAL PAIN: ICD-10-CM

## 2022-11-23 DIAGNOSIS — K21.9 GERD WITHOUT ESOPHAGITIS: ICD-10-CM

## 2022-11-23 DIAGNOSIS — M54.6 PAIN IN THORACIC SPINE: ICD-10-CM

## 2022-11-23 PROCEDURE — 99214 OFFICE O/P EST MOD 30 MIN: CPT | Performed by: INTERNAL MEDICINE

## 2022-11-23 RX ORDER — TRAZODONE HYDROCHLORIDE 50 MG/1
1 TABLET AT BEDTIME AS NEEDED TABLET, FILM COATED ORAL THREE TIMES A DAY
Qty: 90 TABLET | Refills: 3 | Status: ACTIVE | COMMUNITY
Start: 2021-07-26

## 2022-11-23 RX ORDER — OXYCODONE HYDROCHLORIDE AND ACETAMINOPHEN 10; 325 MG/1; MG/1
TAKE 1 TABLET BY ORAL ROUTE EVERY 8 HOURS AS NEEDED TABLET ORAL
Qty: 40 | Refills: 0 | Status: ACTIVE | COMMUNITY
Start: 1900-01-01

## 2022-11-23 RX ORDER — TIZANIDINE HYDROCHLORIDE 4 MG/1
TAKE 1 CAPSULE (4 MG) BY ORAL ROUTE TID PRN CAPSULE ORAL
Qty: 100 | Refills: 0 | Status: ACTIVE | COMMUNITY

## 2022-11-23 RX ORDER — IBUPROFEN 800 MG/1
TAKE 1 TABLET (800 MG) BY ORAL ROUTE 3 TIMES PER DAY WITH FOOD TABLET ORAL
Qty: 0 | Refills: 0 | Status: ACTIVE | COMMUNITY
Start: 1900-01-01

## 2022-11-23 RX ORDER — EXTENDED PHENYTOIN SODIUM 30 MG/1
CAPSULE ORAL
Qty: 0 | Refills: 0 | Status: ACTIVE | COMMUNITY
Start: 1900-01-01

## 2022-11-23 RX ORDER — TEMAZEPAM 30 MG/1
1 CAPSULE AT BEDTIME AS NEEDED CAPSULE ORAL ONCE A DAY
Qty: 30 CAPSULE | Refills: 3 | Status: ACTIVE | COMMUNITY
Start: 2021-07-26

## 2022-11-23 RX ORDER — ALPRAZOLAM 2 MG/1
1 TABLET TABLET ORAL THREE TIMES A DAY
Qty: 90 TABLET | Refills: 3 | Status: ACTIVE | COMMUNITY
Start: 2020-10-19

## 2022-11-23 RX ORDER — TIZANIDINE 4 MG/1
TAKE 1 TABLET BY MOUTH THREE TIMES DAILY AS NEEDED TABLET ORAL
Qty: 100 TABLET | Refills: 0 | Status: ACTIVE | COMMUNITY

## 2022-11-23 RX ORDER — ESOMEPRAZOLE MAGNESIUM 40 MG
CAPSULE,DELAYED RELEASE (ENTERIC COATED) ORAL
Qty: 0 | Refills: 0 | Status: ACTIVE | COMMUNITY
Start: 1900-01-01

## 2022-11-23 RX ORDER — TRAZODONE HYDROCHLORIDE 50 MG/1
TAKE 1 TABLET BY MOUTH AT BEDTIME TABLET ORAL
Qty: 30 | Refills: 2 | Status: ACTIVE | COMMUNITY
Start: 2019-06-27

## 2022-11-23 RX ORDER — ALPRAZOLAM 2 MG/1
1 PO TID PRN TABLET ORAL
Qty: 90 | Refills: 3 | Status: ACTIVE | COMMUNITY

## 2022-11-23 RX ORDER — ONDANSETRON HCL 8 MG
TAKE 1 TABLET BY ORAL ROUTE EVERY 6 HOURS TABLET ORAL
Qty: 20 | Refills: 0 | Status: ACTIVE | COMMUNITY
Start: 2019-07-03

## 2022-11-23 RX ORDER — PROMETHAZINE HYDROCHLORIDE 50 MG/1
TAKE 1 TABLET BY MOUTH THREE TIMES A DAY TABLET ORAL
Qty: 90 | Refills: 4 | Status: ACTIVE | COMMUNITY
Start: 2018-12-16

## 2022-11-23 NOTE — HPI-TODAY'S VISIT:
50 yo patient for abdominal pain f/u. She has been experiencing constant p-prandial abdominal fullness and early satiety w/ constant nausea w/o emesis. Unable to eat much due to her abdominal pain and p-prandial fullness. EGD 10/22: GERD, bile reflux gastritis and normal duodenal bx's. Has been on PPI + diet. She has chronic migraines w N/V being followed by Neurology. She has normal bm's w/o melenic stools nor hematochezia. She has chronic back pain due to multiple back surgeries and marked TS kyphosis and lumbar scoliosis which has been progresing. Has been seen by Ortho and suggested a back brace; not a candidate for additional surgeries. She has been feeling fatigued and tired due to her chronic pain. Chronic insomnia due to chronic back pain. Has been Dx'd w chronic hand eczema being followed by Dermatologist. No fever or chills. No cardiorespiratory sxs. Recent labs all normal. Normal A + P CT scan and CXR. No other complaints.

## 2022-11-28 PROBLEM — 266435005: Status: ACTIVE | Noted: 2022-11-28

## 2022-12-07 ENCOUNTER — TELEPHONE ENCOUNTER (OUTPATIENT)
Dept: URBAN - METROPOLITAN AREA CLINIC 98 | Facility: CLINIC | Age: 52
End: 2022-12-07

## 2022-12-07 RX ORDER — ALPRAZOLAM 2 MG/1
1 TABLET TABLET ORAL THREE TIMES A DAY
Qty: 90 TABLET | Refills: 3
Start: 2020-10-19

## 2022-12-07 RX ORDER — TIZANIDINE 4 MG/1
TAKE 1 TABLET BY MOUTH THREE TIMES DAILY AS NEEDED TABLET ORAL
Qty: 100 TABLET | Refills: 0

## 2022-12-07 RX ORDER — PROMETHAZINE HYDROCHLORIDE 50 MG/1
TAKE 1 TABLET BY MOUTH THREE TIMES A DAY TABLET ORAL
Qty: 90 | Refills: 4
Start: 2018-12-16

## 2022-12-07 RX ORDER — TRAZODONE HYDROCHLORIDE 50 MG/1
TAKE 1 TABLET BY MOUTH AT BEDTIME TABLET ORAL
Qty: 30 | Refills: 2
Start: 2019-06-27

## 2022-12-07 RX ORDER — TEMAZEPAM 30 MG/1
1 CAPSULE AT BEDTIME AS NEEDED CAPSULE ORAL ONCE A DAY
Qty: 30 CAPSULE | Refills: 3
Start: 2021-07-26

## 2023-05-01 ENCOUNTER — OFFICE VISIT (OUTPATIENT)
Dept: URBAN - METROPOLITAN AREA TELEHEALTH 2 | Facility: TELEHEALTH | Age: 53
End: 2023-05-01
Payer: MEDICARE

## 2023-05-01 DIAGNOSIS — K21.9 GERD WITHOUT ESOPHAGITIS: ICD-10-CM

## 2023-05-01 DIAGNOSIS — R10.84 GENERALIZED ABDOMINAL PAIN: ICD-10-CM

## 2023-05-01 PROCEDURE — 99214 OFFICE O/P EST MOD 30 MIN: CPT | Performed by: INTERNAL MEDICINE

## 2023-05-01 RX ORDER — TIZANIDINE HYDROCHLORIDE 4 MG/1
TAKE 1 CAPSULE (4 MG) BY ORAL ROUTE TID PRN CAPSULE ORAL
Qty: 100 | Refills: 0 | Status: ACTIVE | COMMUNITY

## 2023-05-01 RX ORDER — IBUPROFEN 800 MG/1
TAKE 1 TABLET (800 MG) BY ORAL ROUTE 3 TIMES PER DAY WITH FOOD TABLET ORAL
Qty: 0 | Refills: 0 | Status: ACTIVE | COMMUNITY
Start: 1900-01-01

## 2023-05-01 RX ORDER — EXTENDED PHENYTOIN SODIUM 30 MG/1
CAPSULE ORAL
Qty: 0 | Refills: 0 | Status: ACTIVE | COMMUNITY
Start: 1900-01-01

## 2023-05-01 RX ORDER — OXYCODONE HYDROCHLORIDE AND ACETAMINOPHEN 10; 325 MG/1; MG/1
TAKE 1 TABLET BY ORAL ROUTE EVERY 8 HOURS AS NEEDED TABLET ORAL
Qty: 40 | Refills: 0 | Status: ACTIVE | COMMUNITY
Start: 1900-01-01

## 2023-05-01 RX ORDER — ESOMEPRAZOLE MAGNESIUM 40 MG
CAPSULE,DELAYED RELEASE (ENTERIC COATED) ORAL
Qty: 0 | Refills: 0 | Status: ACTIVE | COMMUNITY
Start: 1900-01-01

## 2023-05-01 RX ORDER — ONDANSETRON HCL 8 MG
TAKE 1 TABLET BY ORAL ROUTE EVERY 6 HOURS TABLET ORAL
Qty: 20 | Refills: 0 | Status: ACTIVE | COMMUNITY
Start: 2019-07-03

## 2023-05-01 RX ORDER — ALPRAZOLAM 2 MG/1
1 TABLET TABLET ORAL THREE TIMES A DAY
Qty: 90 TABLET | Refills: 3 | Status: ACTIVE | COMMUNITY
Start: 2020-10-19

## 2023-05-01 RX ORDER — ALPRAZOLAM 2 MG/1
1 PO TID PRN TABLET ORAL
Qty: 90 | Refills: 3 | Status: ACTIVE | COMMUNITY

## 2023-05-01 RX ORDER — TIZANIDINE 4 MG/1
TAKE 1 TABLET BY MOUTH THREE TIMES DAILY AS NEEDED TABLET ORAL
Qty: 100 TABLET | Refills: 0 | Status: ACTIVE | COMMUNITY

## 2023-05-01 RX ORDER — TRAZODONE HYDROCHLORIDE 50 MG/1
1 TABLET AT BEDTIME AS NEEDED TABLET, FILM COATED ORAL THREE TIMES A DAY
Qty: 90 TABLET | Refills: 3 | Status: ACTIVE | COMMUNITY
Start: 2021-07-26

## 2023-05-01 RX ORDER — TEMAZEPAM 30 MG/1
1 CAPSULE AT BEDTIME AS NEEDED CAPSULE ORAL ONCE A DAY
Qty: 30 CAPSULE | Refills: 3 | Status: ACTIVE | COMMUNITY
Start: 2021-07-26

## 2023-05-01 RX ORDER — PROMETHAZINE HYDROCHLORIDE 50 MG/1
TAKE 1 TABLET BY MOUTH THREE TIMES A DAY TABLET ORAL
Qty: 90 | Refills: 4 | Status: ACTIVE | COMMUNITY
Start: 2018-12-16

## 2023-05-01 RX ORDER — TRAZODONE HYDROCHLORIDE 50 MG/1
TAKE 1 TABLET BY MOUTH AT BEDTIME TABLET ORAL
Qty: 30 | Refills: 2 | Status: ACTIVE | COMMUNITY
Start: 2019-06-27

## 2023-05-01 NOTE — HPI-TODAY'S VISIT:
This is a telehealth OV to which patient has agreed to. Limitations of telehealth discussed, pt understands and agrees to proceed. Patient's @ home during this virtual OV.  51 yo patient for abdominal pain f/u. She has been experiencing constant p-prandial abdominal fullness and early satiety w/ constant nausea w/o emesis. Unable to eat much due to her abdominal pain and p-prandial fullness, unrelated to the type of food she eats. Lately, even w liquids.  EGD 10/22: GERD, bile reflux gastritis and normal duodenal bx's. Has been on PPI + diet. She has chronic migraines w N/V being followed by Neurology. She has normal bm's w/o melenic stools nor hematochezia. Seen by her PCP: normal labs x for vitamin D deficiency. She has chronic back pain due to multiple back surgeries and marked TS kyphosis and lumbar scoliosis which has been progressing. Has been seen by Ortho and suggested a back brace; not a candidate for additional surgeries. She has been feeling fatigued and tired due to her chronic pain. Chronic insomnia due to chronic back pain. Has been Dx'd w chronic hand eczema being followed by Dermatologist. No fever or chills. No cardiorespiratory sxs. Recent labs all normal. Normal A + P CT scan and CXR. No other complaints.

## 2023-05-19 ENCOUNTER — TELEPHONE ENCOUNTER (OUTPATIENT)
Dept: URBAN - METROPOLITAN AREA CLINIC 98 | Facility: CLINIC | Age: 53
End: 2023-05-19

## 2023-05-19 RX ORDER — OXYCODONE HYDROCHLORIDE AND ACETAMINOPHEN 10; 325 MG/1; MG/1
TAKE 1 TABLET BY ORAL ROUTE EVERY 8 HOURS AS NEEDED TABLET ORAL
Refills: 0

## 2023-05-22 ENCOUNTER — TELEPHONE ENCOUNTER (OUTPATIENT)
Dept: URBAN - METROPOLITAN AREA CLINIC 98 | Facility: CLINIC | Age: 53
End: 2023-05-22

## 2023-05-24 ENCOUNTER — TELEPHONE ENCOUNTER (OUTPATIENT)
Dept: URBAN - METROPOLITAN AREA CLINIC 98 | Facility: CLINIC | Age: 53
End: 2023-05-24

## 2023-06-21 ENCOUNTER — TELEPHONE ENCOUNTER (OUTPATIENT)
Dept: URBAN - METROPOLITAN AREA CLINIC 98 | Facility: CLINIC | Age: 53
End: 2023-06-21

## 2023-06-22 ENCOUNTER — TELEPHONE ENCOUNTER (OUTPATIENT)
Dept: URBAN - METROPOLITAN AREA CLINIC 98 | Facility: CLINIC | Age: 53
End: 2023-06-22

## 2023-06-22 RX ORDER — PROMETHAZINE HYDROCHLORIDE 50 MG/1
TAKE 1 TABLET BY MOUTH TABLET ORAL THREE TIMES A DAY
Qty: 270 | Refills: 4
Start: 2018-12-16 | End: 2024-09-14

## 2023-06-22 RX ORDER — TIZANIDINE 4 MG/1
TAKE 1 TABLET BY MOUTH AS NEEDED TABLET ORAL THREE TIMES A DAY
Qty: 90 | Refills: 0

## 2023-06-23 ENCOUNTER — TELEPHONE ENCOUNTER (OUTPATIENT)
Dept: URBAN - METROPOLITAN AREA CLINIC 98 | Facility: CLINIC | Age: 53
End: 2023-06-23

## 2023-06-23 ENCOUNTER — ERX REFILL RESPONSE (OUTPATIENT)
Dept: URBAN - METROPOLITAN AREA CLINIC 98 | Facility: CLINIC | Age: 53
End: 2023-06-23

## 2023-06-23 RX ORDER — PROMETHAZINE HYDROCHLORIDE 50 MG/1
TAKE 1 TABLET BY MOUTH THREE TIMES DAILY TABLET ORAL
Qty: 90 TABLET | Refills: 0 | OUTPATIENT

## 2023-06-23 RX ORDER — PROMETHAZINE HYDROCHLORIDE 50 MG/1
TAKE 1 TABLET BY MOUTH THREE TIMES DAILY AS NEEDED TABLET ORAL
Qty: 90 TABLET | Refills: 0 | OUTPATIENT

## 2023-07-20 ENCOUNTER — TELEPHONE ENCOUNTER (OUTPATIENT)
Dept: URBAN - METROPOLITAN AREA CLINIC 98 | Facility: CLINIC | Age: 53
End: 2023-07-20

## 2023-07-20 RX ORDER — TIZANIDINE 4 MG/1
TAKE 1 TABLET BY MOUTH AS NEEDED TABLET ORAL THREE TIMES A DAY
Qty: 90 | Refills: 0
End: 2023-09-21

## 2023-07-20 RX ORDER — TRAZODONE HYDROCHLORIDE 50 MG/1
1 TABLET AT BEDTIME AS NEEDED TABLET, FILM COATED ORAL THREE TIMES A DAY
Qty: 90 TABLET | Refills: 3
Start: 2021-07-26

## 2023-07-20 RX ORDER — OXYCODONE HYDROCHLORIDE AND ACETAMINOPHEN 10; 325 MG/1; MG/1
TAKE 1 TABLET BY ORAL ROUTE EVERY 8 HOURS AS NEEDED TABLET ORAL
Refills: 0
End: 2023-11-20

## 2023-07-20 RX ORDER — TEMAZEPAM 30 MG/1
1 CAPSULE AT BEDTIME AS NEEDED CAPSULE ORAL ONCE A DAY
Qty: 30 CAPSULE | Refills: 3
Start: 2021-07-26

## 2023-07-31 ENCOUNTER — ERX REFILL RESPONSE (OUTPATIENT)
Dept: URBAN - METROPOLITAN AREA CLINIC 98 | Facility: CLINIC | Age: 53
End: 2023-07-31

## 2023-07-31 RX ORDER — TRAZODONE HYDROCHLORIDE 50 MG/1
TAKE 1 TABLET BY MOUTH EVERY NIGHT AT BEDTIME TABLET ORAL
Qty: 30 TABLET | Refills: 0 | OUTPATIENT

## 2023-08-29 ENCOUNTER — ERX REFILL RESPONSE (OUTPATIENT)
Dept: URBAN - METROPOLITAN AREA CLINIC 98 | Facility: CLINIC | Age: 53
End: 2023-08-29

## 2023-08-29 RX ORDER — TIZANIDINE 4 MG/1
TAKE 1 TABLET BY MOUTH THREE TIMES DAILY AS NEEDED TABLET ORAL
Qty: 100 TABLET | Refills: 0 | OUTPATIENT

## 2023-08-29 RX ORDER — TIZANIDINE HYDROCHLORIDE 4 MG/1
TAKE 1 CAPSULE (4 MG) BY ORAL ROUTE TID PRN CAPSULE ORAL
Qty: 100 | Refills: 0 | OUTPATIENT

## 2023-08-29 RX ORDER — TIZANIDINE HYDROCHLORIDE 4 MG/1
TAKE 1 CAPSULE(4 MG) BY MOUTH THREE TIMES DAILY FOR 3 DAYS AS NEEDED CAPSULE ORAL
Qty: 100 CAPSULE | Refills: 0 | OUTPATIENT

## 2023-09-06 ENCOUNTER — OFFICE VISIT (OUTPATIENT)
Dept: URBAN - METROPOLITAN AREA TELEHEALTH 2 | Facility: TELEHEALTH | Age: 53
End: 2023-09-06

## 2023-09-06 RX ORDER — OXYCODONE HYDROCHLORIDE AND ACETAMINOPHEN 10; 325 MG/1; MG/1
TAKE 1 TABLET BY ORAL ROUTE EVERY 8 HOURS AS NEEDED TABLET ORAL
Refills: 0 | Status: ACTIVE | COMMUNITY
End: 2023-11-20

## 2023-09-06 RX ORDER — TIZANIDINE 4 MG/1
TAKE 1 TABLET BY MOUTH THREE TIMES DAILY AS NEEDED TABLET ORAL
Qty: 100 TABLET | Refills: 0 | Status: ACTIVE | COMMUNITY

## 2023-09-06 RX ORDER — ALPRAZOLAM 2 MG/1
1 PO TID PRN TABLET ORAL
Qty: 90 | Refills: 3 | Status: ACTIVE | COMMUNITY

## 2023-09-06 RX ORDER — ESOMEPRAZOLE MAGNESIUM 40 MG
CAPSULE,DELAYED RELEASE (ENTERIC COATED) ORAL
Qty: 0 | Refills: 0 | Status: ACTIVE | COMMUNITY
Start: 1900-01-01

## 2023-09-06 RX ORDER — EXTENDED PHENYTOIN SODIUM 30 MG/1
CAPSULE ORAL
Qty: 0 | Refills: 0 | Status: ACTIVE | COMMUNITY
Start: 1900-01-01

## 2023-09-06 RX ORDER — TEMAZEPAM 30 MG/1
1 CAPSULE AT BEDTIME AS NEEDED CAPSULE ORAL ONCE A DAY
Qty: 30 CAPSULE | Refills: 3 | Status: ACTIVE | COMMUNITY
Start: 2021-07-26

## 2023-09-06 RX ORDER — PROMETHAZINE HYDROCHLORIDE 50 MG/1
TAKE 1 TABLET BY MOUTH THREE TIMES DAILY AS NEEDED TABLET ORAL
Qty: 90 TABLET | Refills: 0 | Status: ACTIVE | COMMUNITY

## 2023-09-06 RX ORDER — TRAZODONE HYDROCHLORIDE 50 MG/1
TAKE 1 TABLET BY MOUTH EVERY NIGHT AT BEDTIME TABLET ORAL
Qty: 30 TABLET | Refills: 0 | Status: ACTIVE | COMMUNITY

## 2023-09-06 RX ORDER — TIZANIDINE HYDROCHLORIDE 4 MG/1
TAKE 1 CAPSULE(4 MG) BY MOUTH THREE TIMES DAILY FOR 3 DAYS AS NEEDED CAPSULE ORAL
Qty: 100 CAPSULE | Refills: 0 | Status: ACTIVE | COMMUNITY

## 2023-09-06 RX ORDER — ALPRAZOLAM 2 MG/1
1 TABLET TABLET ORAL THREE TIMES A DAY
Qty: 90 TABLET | Refills: 3 | Status: ACTIVE | COMMUNITY
Start: 2020-10-19

## 2023-09-06 RX ORDER — IBUPROFEN 800 MG/1
TAKE 1 TABLET (800 MG) BY ORAL ROUTE 3 TIMES PER DAY WITH FOOD TABLET ORAL
Qty: 0 | Refills: 0 | Status: ACTIVE | COMMUNITY
Start: 1900-01-01

## 2023-09-06 RX ORDER — ONDANSETRON HCL 8 MG
TAKE 1 TABLET BY ORAL ROUTE EVERY 6 HOURS TABLET ORAL
Qty: 20 | Refills: 0 | Status: ACTIVE | COMMUNITY
Start: 2019-07-03

## 2023-09-14 ENCOUNTER — LAB OUTSIDE AN ENCOUNTER (OUTPATIENT)
Dept: URBAN - METROPOLITAN AREA CLINIC 98 | Facility: CLINIC | Age: 53
End: 2023-09-14

## 2023-09-14 ENCOUNTER — OFFICE VISIT (OUTPATIENT)
Dept: URBAN - METROPOLITAN AREA CLINIC 98 | Facility: CLINIC | Age: 53
End: 2023-09-14
Payer: MEDICARE

## 2023-09-14 ENCOUNTER — WEB ENCOUNTER (OUTPATIENT)
Dept: URBAN - METROPOLITAN AREA CLINIC 98 | Facility: CLINIC | Age: 53
End: 2023-09-14

## 2023-09-14 ENCOUNTER — OFFICE VISIT (OUTPATIENT)
Dept: URBAN - METROPOLITAN AREA CLINIC 98 | Facility: CLINIC | Age: 53
End: 2023-09-14

## 2023-09-14 VITALS
WEIGHT: 147 LBS | DIASTOLIC BLOOD PRESSURE: 70 MMHG | HEIGHT: 60 IN | HEART RATE: 73 BPM | TEMPERATURE: 97.3 F | SYSTOLIC BLOOD PRESSURE: 138 MMHG | BODY MASS INDEX: 28.86 KG/M2

## 2023-09-14 DIAGNOSIS — R10.84 GENERALIZED ABDOMINAL PAIN: ICD-10-CM

## 2023-09-14 DIAGNOSIS — K21.9 GERD WITHOUT ESOPHAGITIS: ICD-10-CM

## 2023-09-14 DIAGNOSIS — G89.29 OTHER CHRONIC PAIN: ICD-10-CM

## 2023-09-14 DIAGNOSIS — M54.6 PAIN IN THORACIC SPINE: ICD-10-CM

## 2023-09-14 PROCEDURE — 99214 OFFICE O/P EST MOD 30 MIN: CPT | Performed by: INTERNAL MEDICINE

## 2023-09-14 RX ORDER — TRAZODONE HYDROCHLORIDE 50 MG/1
TAKE 1 TABLET BY MOUTH EVERY NIGHT AT BEDTIME TABLET ORAL
Qty: 30 TABLET | Refills: 0 | Status: ACTIVE | COMMUNITY

## 2023-09-14 RX ORDER — OXYCODONE HYDROCHLORIDE AND ACETAMINOPHEN 10; 325 MG/1; MG/1
TAKE 1 TABLET BY ORAL ROUTE EVERY 8 HOURS AS NEEDED TABLET ORAL
Refills: 0 | Status: ACTIVE | COMMUNITY
End: 2023-11-20

## 2023-09-14 RX ORDER — IBUPROFEN 800 MG/1
TAKE 1 TABLET (800 MG) BY ORAL ROUTE 3 TIMES PER DAY WITH FOOD TABLET ORAL
Qty: 0 | Refills: 0 | Status: ACTIVE | COMMUNITY
Start: 1900-01-01

## 2023-09-14 RX ORDER — ESOMEPRAZOLE MAGNESIUM 40 MG
CAPSULE,DELAYED RELEASE (ENTERIC COATED) ORAL
Qty: 0 | Refills: 0 | Status: ACTIVE | COMMUNITY
Start: 1900-01-01

## 2023-09-14 RX ORDER — EXTENDED PHENYTOIN SODIUM 30 MG/1
CAPSULE ORAL
Qty: 0 | Refills: 0 | Status: ACTIVE | COMMUNITY
Start: 1900-01-01

## 2023-09-14 RX ORDER — ALPRAZOLAM 2 MG/1
1 TABLET TABLET ORAL THREE TIMES A DAY
Qty: 90 TABLET | Refills: 3 | Status: ACTIVE | COMMUNITY
Start: 2020-10-19

## 2023-09-14 RX ORDER — TIZANIDINE HYDROCHLORIDE 4 MG/1
TAKE 1 CAPSULE(4 MG) BY MOUTH THREE TIMES DAILY FOR 3 DAYS AS NEEDED CAPSULE ORAL
Qty: 100 CAPSULE | Refills: 0 | Status: ACTIVE | COMMUNITY

## 2023-09-14 RX ORDER — ONDANSETRON HCL 8 MG
TAKE 1 TABLET BY ORAL ROUTE EVERY 6 HOURS TABLET ORAL
Qty: 20 | Refills: 0 | Status: ACTIVE | COMMUNITY
Start: 2019-07-03

## 2023-09-14 RX ORDER — TEMAZEPAM 30 MG/1
1 CAPSULE AT BEDTIME AS NEEDED CAPSULE ORAL ONCE A DAY
Qty: 30 CAPSULE | Refills: 3 | Status: ACTIVE | COMMUNITY
Start: 2021-07-26

## 2023-09-14 RX ORDER — PROMETHAZINE HYDROCHLORIDE 50 MG/1
TAKE 1 TABLET BY MOUTH THREE TIMES DAILY AS NEEDED TABLET ORAL
Qty: 90 TABLET | Refills: 0 | Status: ACTIVE | COMMUNITY

## 2023-09-14 NOTE — HPI-TODAY'S VISIT:
53 yo patient for abdominal pain f/u. She has been experiencing constant p-prandial abdominal fullness and early satiety w/ constant nausea w/o emesis. Unable to eat much due to her abdominal pain and p-prandial fullness, unrelated to the type of food she eats. Lately, even w liquids.  EGD 10/22: GERD, bile reflux gastritis and normal duodenal bx's. Has been on PPI + diet. She has chronic migraines w N/V being followed by Neurology. She has normal bm's w/o melenic stools nor hematochezia. Seen by her PCP: normal labs x for vitamin D deficiency. She has chronic back pain due to multiple back surgeries and marked TS kyphosis and lumbar scoliosis which has been progressing. Has been seen by Ortho and suggested a back brace; not a candidate for additional surgeries. She has been feeling fatigued and tired due to her chronic pain. Chronic insomnia due to chronic back pain. Has been Dx'd w chronic hand eczema being followed by Dermatologist. No fever or chills. No cardiorespiratory sxs. Recent labs all normal. Normal A + P CT scan and CXR. No other complaints. yes

## 2023-09-15 ENCOUNTER — TELEPHONE ENCOUNTER (OUTPATIENT)
Dept: URBAN - METROPOLITAN AREA CLINIC 98 | Facility: CLINIC | Age: 53
End: 2023-09-15

## 2023-09-15 RX ORDER — ALPRAZOLAM 2 MG/1
1 TABLET TABLET ORAL THREE TIMES A DAY
Qty: 90 TABLET | Refills: 3
Start: 2020-10-19

## 2023-10-09 ENCOUNTER — TELEPHONE ENCOUNTER (OUTPATIENT)
Dept: URBAN - METROPOLITAN AREA CLINIC 98 | Facility: CLINIC | Age: 53
End: 2023-10-09

## 2023-10-09 RX ORDER — PROMETHAZINE HYDROCHLORIDE 50 MG/1
TAKE 1 TABLET BY MOUTH THREE TIMES DAILY AS NEEDED TABLET ORAL
Qty: 90 TABLET | Refills: 0

## 2023-10-09 RX ORDER — ALPRAZOLAM 2 MG/1
1 TABLET TABLET ORAL THREE TIMES A DAY
Qty: 90 TABLET | Refills: 3
Start: 2020-10-19

## 2023-10-09 RX ORDER — TEMAZEPAM 30 MG/1
1 CAPSULE AT BEDTIME AS NEEDED CAPSULE ORAL ONCE A DAY
Qty: 30 CAPSULE | Refills: 3
Start: 2021-07-26

## 2023-10-09 RX ORDER — TIZANIDINE HYDROCHLORIDE 4 MG/1
TAKE 1 CAPSULE(4 MG) BY MOUTH THREE TIMES DAILY FOR 3 DAYS AS NEEDED CAPSULE ORAL
Qty: 100 CAPSULE | Refills: 0

## 2023-10-09 RX ORDER — TRAZODONE HYDROCHLORIDE 50 MG/1
TAKE 1 TABLET BY MOUTH EVERY NIGHT AT BEDTIME TABLET ORAL
Qty: 30 TABLET | Refills: 0

## 2023-10-20 ENCOUNTER — TELEPHONE ENCOUNTER (OUTPATIENT)
Dept: URBAN - METROPOLITAN AREA CLINIC 98 | Facility: CLINIC | Age: 53
End: 2023-10-20

## 2023-10-23 ENCOUNTER — TELEPHONE ENCOUNTER (OUTPATIENT)
Dept: URBAN - METROPOLITAN AREA CLINIC 98 | Facility: CLINIC | Age: 53
End: 2023-10-23

## 2023-11-06 ENCOUNTER — OFFICE VISIT (OUTPATIENT)
Dept: URBAN - METROPOLITAN AREA SURGERY CENTER 18 | Facility: SURGERY CENTER | Age: 53
End: 2023-11-06
Payer: MEDICARE

## 2023-11-06 ENCOUNTER — CLAIMS CREATED FROM THE CLAIM WINDOW (OUTPATIENT)
Dept: URBAN - METROPOLITAN AREA CLINIC 4 | Facility: CLINIC | Age: 53
End: 2023-11-06
Payer: MEDICARE

## 2023-11-06 DIAGNOSIS — K31.89 OTHER DISEASES OF STOMACH AND DUODENUM: ICD-10-CM

## 2023-11-06 DIAGNOSIS — K92.9 ILEAL POUCH-ANAL ANASTOMOTIC LEAK: ICD-10-CM

## 2023-11-06 DIAGNOSIS — K25.9 ANTRAL ULCER: ICD-10-CM

## 2023-11-06 DIAGNOSIS — K21.9 GASTRO-ESOPHAGEAL REFLUX DISEASE WITHOUT ESOPHAGITIS: ICD-10-CM

## 2023-11-06 DIAGNOSIS — R10.13 EPIGASTRIC ABDOMINAL PAIN: ICD-10-CM

## 2023-11-06 DIAGNOSIS — K44.9 HIATAL HERNIA: ICD-10-CM

## 2023-11-06 DIAGNOSIS — K25.7 CHRONIC GASTRIC ULCER WITHOUT HEMORRHAGE OR PERFORATION: ICD-10-CM

## 2023-11-06 DIAGNOSIS — K31.9 ERYTHEMA OF GASTRIC ANTRUM: ICD-10-CM

## 2023-11-06 DIAGNOSIS — K21.9 ACID REFLUX: ICD-10-CM

## 2023-11-06 DIAGNOSIS — K29.60 ADENOPAPILLOMATOSIS GASTRICA: ICD-10-CM

## 2023-11-06 PROCEDURE — 88305 TISSUE EXAM BY PATHOLOGIST: CPT | Performed by: PATHOLOGY

## 2023-11-06 PROCEDURE — 88342 IMHCHEM/IMCYTCHM 1ST ANTB: CPT | Performed by: PATHOLOGY

## 2023-11-06 PROCEDURE — 00731 ANES UPR GI NDSC PX NOS: CPT | Performed by: REGISTERED NURSE

## 2023-11-06 PROCEDURE — G8907 PT DOC NO EVENTS ON DISCHARG: HCPCS | Performed by: INTERNAL MEDICINE

## 2023-11-06 PROCEDURE — 88312 SPECIAL STAINS GROUP 1: CPT | Performed by: PATHOLOGY

## 2023-11-06 PROCEDURE — 43239 EGD BIOPSY SINGLE/MULTIPLE: CPT | Performed by: INTERNAL MEDICINE

## 2023-11-15 ENCOUNTER — TELEPHONE ENCOUNTER (OUTPATIENT)
Dept: URBAN - METROPOLITAN AREA CLINIC 98 | Facility: CLINIC | Age: 53
End: 2023-11-15

## 2023-11-17 ENCOUNTER — TELEPHONE ENCOUNTER (OUTPATIENT)
Dept: URBAN - METROPOLITAN AREA CLINIC 98 | Facility: CLINIC | Age: 53
End: 2023-11-17

## 2023-12-11 ENCOUNTER — TELEPHONE ENCOUNTER (OUTPATIENT)
Dept: URBAN - METROPOLITAN AREA CLINIC 98 | Facility: CLINIC | Age: 53
End: 2023-12-11

## 2023-12-11 RX ORDER — TIZANIDINE 4 MG/1
1 TABLET AS NEEDED TABLET ORAL THREE TIMES A DAY
Qty: 90 TABLET | Refills: 3 | OUTPATIENT
Start: 2023-12-11 | End: 2024-04-09

## 2023-12-12 ENCOUNTER — CLAIMS CREATED FROM THE CLAIM WINDOW (OUTPATIENT)
Dept: URBAN - METROPOLITAN AREA CLINIC 98 | Facility: CLINIC | Age: 53
End: 2023-12-12
Payer: MEDICARE

## 2023-12-12 VITALS
HEART RATE: 73 BPM | SYSTOLIC BLOOD PRESSURE: 129 MMHG | WEIGHT: 146.4 LBS | BODY MASS INDEX: 28.74 KG/M2 | TEMPERATURE: 97.2 F | DIASTOLIC BLOOD PRESSURE: 76 MMHG | HEIGHT: 60 IN

## 2023-12-12 DIAGNOSIS — K29.00 ACUTE SUPERFICIAL GASTRITIS WITHOUT HEMORRHAGE: ICD-10-CM

## 2023-12-12 DIAGNOSIS — M54.6 PAIN IN THORACIC SPINE: ICD-10-CM

## 2023-12-12 DIAGNOSIS — R10.84 GENERALIZED ABDOMINAL PAIN: ICD-10-CM

## 2023-12-12 DIAGNOSIS — G89.29 OTHER CHRONIC PAIN: ICD-10-CM

## 2023-12-12 DIAGNOSIS — K21.9 GERD WITHOUT ESOPHAGITIS: ICD-10-CM

## 2023-12-12 PROCEDURE — 91065 BREATH HYDROGEN/METHANE TEST: CPT | Performed by: INTERNAL MEDICINE

## 2023-12-12 PROCEDURE — 99214 OFFICE O/P EST MOD 30 MIN: CPT | Performed by: INTERNAL MEDICINE

## 2023-12-12 RX ORDER — PROMETHAZINE HYDROCHLORIDE 50 MG/1
TAKE 1 TABLET BY MOUTH THREE TIMES DAILY AS NEEDED TABLET ORAL
Qty: 90 TABLET | Refills: 0 | Status: ACTIVE | COMMUNITY

## 2023-12-12 RX ORDER — ONDANSETRON HCL 8 MG
TAKE 1 TABLET BY ORAL ROUTE EVERY 6 HOURS TABLET ORAL
Qty: 20 | Refills: 0 | Status: ACTIVE | COMMUNITY
Start: 2019-07-03

## 2023-12-12 RX ORDER — IBUPROFEN 800 MG/1
TAKE 1 TABLET (800 MG) BY ORAL ROUTE 3 TIMES PER DAY WITH FOOD TABLET ORAL
Qty: 0 | Refills: 0 | Status: ACTIVE | COMMUNITY
Start: 1900-01-01

## 2023-12-12 RX ORDER — ALPRAZOLAM 2 MG/1
1 TABLET TABLET ORAL THREE TIMES A DAY
Qty: 90 TABLET | Refills: 3 | Status: ACTIVE | COMMUNITY
Start: 2020-10-19

## 2023-12-12 RX ORDER — TIZANIDINE 4 MG/1
1 TABLET AS NEEDED TABLET ORAL THREE TIMES A DAY
Qty: 90 TABLET | Refills: 3 | Status: ACTIVE | COMMUNITY
Start: 2023-12-11 | End: 2024-04-09

## 2023-12-12 RX ORDER — TEMAZEPAM 30 MG/1
1 CAPSULE AT BEDTIME AS NEEDED CAPSULE ORAL ONCE A DAY
Qty: 30 CAPSULE | Refills: 3 | Status: ACTIVE | COMMUNITY
Start: 2021-07-26

## 2023-12-12 RX ORDER — ESOMEPRAZOLE MAGNESIUM 40 MG
CAPSULE,DELAYED RELEASE (ENTERIC COATED) ORAL
Qty: 0 | Refills: 0 | Status: ACTIVE | COMMUNITY
Start: 1900-01-01

## 2023-12-12 RX ORDER — TRAZODONE HYDROCHLORIDE 50 MG/1
TAKE 1 TABLET BY MOUTH EVERY NIGHT AT BEDTIME TABLET ORAL
Qty: 30 TABLET | Refills: 0 | Status: ACTIVE | COMMUNITY

## 2023-12-12 RX ORDER — EXTENDED PHENYTOIN SODIUM 30 MG/1
CAPSULE ORAL
Qty: 0 | Refills: 0 | Status: ACTIVE | COMMUNITY
Start: 1900-01-01

## 2023-12-12 RX ORDER — TIZANIDINE HYDROCHLORIDE 4 MG/1
TAKE 1 CAPSULE(4 MG) BY MOUTH THREE TIMES DAILY FOR 3 DAYS AS NEEDED CAPSULE ORAL
Qty: 100 CAPSULE | Refills: 0 | Status: ACTIVE | COMMUNITY

## 2023-12-12 NOTE — HPI-TODAY'S VISIT:
54 yo patient for abdominal pain f/u. She has been experiencing constant p-prandial abdominal fullness and early satiety w/ constant nausea w/o emesis. Unable to eat much due to her abdominal pain and p-prandial fullness, unrelated to the type of food she eats. Lately, less abdominal pain but unresolved despite diet and Nexium 40 mg po qd ac BF. EGD 11/23: GERD, bile reflux gastritis, erosive gastritis w features of treated Hp gastritis and normal duodenal bx's. Has been on PPI + diet. She has chronic migraines w N/V being followed by Neurology. She has normal bm's w/o melenic stools nor hematochezia. Seen by her PCP: normal labs x for vitamin D deficiency. She has chronic back pain due to multiple back surgeries and marked TS kyphosis and lumbar scoliosis which has been progressing. Has been seen by Ortho and suggested a back brace; not a candidate for additional surgeries. She has dany on Percocet for pain control along w Tizanidine 4 mg po tid. She has been feeling fatigued and tired due to her chronic pain. Chronic insomnia due to chronic back pain. Has been Dx'd w chronic hand eczema being followed by Dermatologist. No fever or chills. No cardiorespiratory sxs. Recent labs all normal. Normal A + P CT scan and CXR. No other complaints.

## 2023-12-18 ENCOUNTER — TELEPHONE ENCOUNTER (OUTPATIENT)
Dept: URBAN - METROPOLITAN AREA CLINIC 98 | Facility: CLINIC | Age: 53
End: 2023-12-18

## 2023-12-18 RX ORDER — TIZANIDINE 4 MG/1
1 TABLET AS NEEDED TABLET ORAL THREE TIMES A DAY
Qty: 90 TABLET | Refills: 3
Start: 2023-12-11 | End: 2024-04-18

## 2023-12-18 RX ORDER — TIZANIDINE 4 MG/1
1 TABLET AS NEEDED TABLET ORAL THREE TIMES A DAY
Qty: 90 TABLET | Refills: 3
Start: 2023-12-11 | End: 2024-04-16

## 2023-12-21 LAB — H PYLORI BREATH TEST: NEGATIVE

## 2024-01-08 ENCOUNTER — TELEPHONE ENCOUNTER (OUTPATIENT)
Dept: URBAN - METROPOLITAN AREA CLINIC 98 | Facility: CLINIC | Age: 54
End: 2024-01-08

## 2024-01-08 RX ORDER — PROMETHAZINE HYDROCHLORIDE 50 MG/1
TAKE 1 TABLET BY MOUTH THREE TIMES DAILY AS NEEDED TABLET ORAL
Qty: 90 TABLET | Refills: 0

## 2024-01-08 RX ORDER — TIZANIDINE HYDROCHLORIDE 4 MG/1
TAKE 1 CAPSULE(4 MG) BY MOUTH THREE TIMES DAILY FOR 3 DAYS AS NEEDED CAPSULE ORAL
Qty: 100 CAPSULE | Refills: 0

## 2024-01-08 RX ORDER — TRAZODONE HYDROCHLORIDE 50 MG/1
TAKE 1 TABLET BY MOUTH EVERY NIGHT AT BEDTIME TABLET ORAL
Qty: 30 TABLET | Refills: 0

## 2024-01-08 RX ORDER — TEMAZEPAM 30 MG/1
1 CAPSULE AT BEDTIME AS NEEDED CAPSULE ORAL ONCE A DAY
Qty: 30 CAPSULE | Refills: 3
Start: 2021-07-26

## 2024-01-10 ENCOUNTER — OFFICE VISIT (OUTPATIENT)
Dept: URBAN - METROPOLITAN AREA CLINIC 98 | Facility: CLINIC | Age: 54
End: 2024-01-10

## 2024-01-10 ENCOUNTER — TELEPHONE ENCOUNTER (OUTPATIENT)
Dept: URBAN - METROPOLITAN AREA CLINIC 98 | Facility: CLINIC | Age: 54
End: 2024-01-10

## 2024-01-10 RX ORDER — TIZANIDINE 4 MG/1
1 TABLET AS NEEDED TABLET ORAL THREE TIMES A DAY
Qty: 90 TABLET | Refills: 3 | Status: ACTIVE | COMMUNITY
Start: 2023-12-11 | End: 2024-04-18

## 2024-01-10 RX ORDER — ESOMEPRAZOLE MAGNESIUM 40 MG
CAPSULE,DELAYED RELEASE (ENTERIC COATED) ORAL
Qty: 0 | Refills: 0 | Status: ACTIVE | COMMUNITY
Start: 1900-01-01

## 2024-01-10 RX ORDER — ALPRAZOLAM 2 MG/1
1 TABLET TABLET ORAL THREE TIMES A DAY
Qty: 90 TABLET | Refills: 3 | Status: ACTIVE | COMMUNITY
Start: 2020-10-19

## 2024-01-10 RX ORDER — TIZANIDINE HYDROCHLORIDE 4 MG/1
TAKE 1 CAPSULE(4 MG) BY MOUTH THREE TIMES DAILY FOR 3 DAYS AS NEEDED CAPSULE ORAL
Qty: 100 CAPSULE | Refills: 0 | Status: ACTIVE | COMMUNITY

## 2024-01-10 RX ORDER — PROMETHAZINE HYDROCHLORIDE 50 MG/1
TAKE 1 TABLET BY MOUTH THREE TIMES DAILY AS NEEDED TABLET ORAL
Qty: 90 TABLET | Refills: 0 | Status: ACTIVE | COMMUNITY

## 2024-01-10 RX ORDER — TEMAZEPAM 30 MG/1
1 CAPSULE AT BEDTIME AS NEEDED CAPSULE ORAL ONCE A DAY
Qty: 30 CAPSULE | Refills: 3 | Status: ACTIVE | COMMUNITY
Start: 2021-07-26

## 2024-01-10 RX ORDER — EXTENDED PHENYTOIN SODIUM 30 MG/1
CAPSULE ORAL
Qty: 0 | Refills: 0 | Status: ACTIVE | COMMUNITY
Start: 1900-01-01

## 2024-01-10 RX ORDER — IBUPROFEN 800 MG/1
TAKE 1 TABLET (800 MG) BY ORAL ROUTE 3 TIMES PER DAY WITH FOOD TABLET ORAL
Qty: 0 | Refills: 0 | Status: ACTIVE | COMMUNITY
Start: 1900-01-01

## 2024-01-10 RX ORDER — TRAZODONE HYDROCHLORIDE 50 MG/1
TAKE 1 TABLET BY MOUTH EVERY NIGHT AT BEDTIME TABLET ORAL
Qty: 30 TABLET | Refills: 0 | Status: ACTIVE | COMMUNITY

## 2024-01-10 RX ORDER — ONDANSETRON HCL 8 MG
TAKE 1 TABLET BY ORAL ROUTE EVERY 6 HOURS TABLET ORAL
Qty: 20 | Refills: 0 | Status: ACTIVE | COMMUNITY
Start: 2019-07-03

## 2024-01-13 ENCOUNTER — OFFICE VISIT (OUTPATIENT)
Dept: URBAN - METROPOLITAN AREA TELEHEALTH 2 | Facility: TELEHEALTH | Age: 54
End: 2024-01-13

## 2024-01-13 PROCEDURE — 993 APS NON BILLABLE: Performed by: INTERNAL MEDICINE

## 2024-01-13 RX ORDER — ALPRAZOLAM 2 MG/1
1 TABLET TABLET ORAL THREE TIMES A DAY
Qty: 90 TABLET | Refills: 3 | Status: ACTIVE | COMMUNITY
Start: 2020-10-19

## 2024-01-13 RX ORDER — EXTENDED PHENYTOIN SODIUM 30 MG/1
CAPSULE ORAL
Qty: 0 | Refills: 0 | Status: ACTIVE | COMMUNITY
Start: 1900-01-01

## 2024-01-13 RX ORDER — TRAZODONE HYDROCHLORIDE 50 MG/1
TAKE 1 TABLET BY MOUTH EVERY NIGHT AT BEDTIME TABLET ORAL
Qty: 30 TABLET | Refills: 0 | Status: ACTIVE | COMMUNITY

## 2024-01-13 RX ORDER — IBUPROFEN 800 MG/1
TAKE 1 TABLET (800 MG) BY ORAL ROUTE 3 TIMES PER DAY WITH FOOD TABLET ORAL
Qty: 0 | Refills: 0 | Status: ACTIVE | COMMUNITY
Start: 1900-01-01

## 2024-01-13 RX ORDER — TEMAZEPAM 30 MG/1
1 CAPSULE AT BEDTIME AS NEEDED CAPSULE ORAL ONCE A DAY
Qty: 30 CAPSULE | Refills: 3 | Status: ACTIVE | COMMUNITY
Start: 2021-07-26

## 2024-01-13 RX ORDER — TIZANIDINE 4 MG/1
1 TABLET AS NEEDED TABLET ORAL THREE TIMES A DAY
Qty: 90 TABLET | Refills: 3 | Status: ACTIVE | COMMUNITY
Start: 2023-12-11 | End: 2024-04-18

## 2024-01-13 RX ORDER — ONDANSETRON HCL 8 MG
TAKE 1 TABLET BY ORAL ROUTE EVERY 6 HOURS TABLET ORAL
Qty: 20 | Refills: 0 | Status: ACTIVE | COMMUNITY
Start: 2019-07-03

## 2024-01-13 RX ORDER — ESOMEPRAZOLE MAGNESIUM 40 MG
CAPSULE,DELAYED RELEASE (ENTERIC COATED) ORAL
Qty: 0 | Refills: 0 | Status: ACTIVE | COMMUNITY
Start: 1900-01-01

## 2024-01-13 RX ORDER — PROMETHAZINE HYDROCHLORIDE 50 MG/1
TAKE 1 TABLET BY MOUTH THREE TIMES DAILY AS NEEDED TABLET ORAL
Qty: 90 TABLET | Refills: 0 | Status: ACTIVE | COMMUNITY

## 2024-01-13 RX ORDER — TIZANIDINE HYDROCHLORIDE 4 MG/1
TAKE 1 CAPSULE(4 MG) BY MOUTH THREE TIMES DAILY FOR 3 DAYS AS NEEDED CAPSULE ORAL
Qty: 100 CAPSULE | Refills: 0 | Status: ACTIVE | COMMUNITY

## 2024-01-16 ENCOUNTER — TELEPHONE ENCOUNTER (OUTPATIENT)
Dept: URBAN - METROPOLITAN AREA CLINIC 98 | Facility: CLINIC | Age: 54
End: 2024-01-16

## 2024-01-17 ENCOUNTER — OFFICE VISIT (OUTPATIENT)
Dept: URBAN - METROPOLITAN AREA CLINIC 98 | Facility: CLINIC | Age: 54
End: 2024-01-17
Payer: MEDICARE

## 2024-01-17 ENCOUNTER — DASHBOARD ENCOUNTERS (OUTPATIENT)
Age: 54
End: 2024-01-17

## 2024-01-17 VITALS
TEMPERATURE: 97 F | WEIGHT: 147 LBS | SYSTOLIC BLOOD PRESSURE: 150 MMHG | BODY MASS INDEX: 28.86 KG/M2 | HEART RATE: 73 BPM | HEIGHT: 60 IN | DIASTOLIC BLOOD PRESSURE: 107 MMHG

## 2024-01-17 DIAGNOSIS — K21.9 GERD WITHOUT ESOPHAGITIS: ICD-10-CM

## 2024-01-17 DIAGNOSIS — R10.84 GENERALIZED ABDOMINAL PAIN: ICD-10-CM

## 2024-01-17 DIAGNOSIS — M54.6 PAIN IN THORACIC SPINE: ICD-10-CM

## 2024-01-17 DIAGNOSIS — G89.29 OTHER CHRONIC PAIN: ICD-10-CM

## 2024-01-17 DIAGNOSIS — K29.00 ACUTE SUPERFICIAL GASTRITIS WITHOUT HEMORRHAGE: ICD-10-CM

## 2024-01-17 PROCEDURE — 99213 OFFICE O/P EST LOW 20 MIN: CPT | Performed by: INTERNAL MEDICINE

## 2024-01-17 RX ORDER — PROMETHAZINE HYDROCHLORIDE 50 MG/1
TAKE 1 TABLET BY MOUTH THREE TIMES DAILY AS NEEDED TABLET ORAL
Qty: 90 TABLET | Refills: 0 | Status: ACTIVE | COMMUNITY

## 2024-01-17 RX ORDER — TIZANIDINE HYDROCHLORIDE 4 MG/1
TAKE 1 CAPSULE(4 MG) BY MOUTH THREE TIMES DAILY FOR 3 DAYS AS NEEDED CAPSULE ORAL
Qty: 100 CAPSULE | Refills: 0 | Status: ACTIVE | COMMUNITY

## 2024-01-17 RX ORDER — ALPRAZOLAM 2 MG/1
1 TABLET TABLET ORAL THREE TIMES A DAY
Qty: 90 TABLET | Refills: 3 | Status: ACTIVE | COMMUNITY
Start: 2020-10-19

## 2024-01-17 RX ORDER — IBUPROFEN 800 MG/1
TAKE 1 TABLET (800 MG) BY ORAL ROUTE 3 TIMES PER DAY WITH FOOD TABLET ORAL
Qty: 0 | Refills: 0 | Status: ACTIVE | COMMUNITY
Start: 1900-01-01

## 2024-01-17 RX ORDER — ONDANSETRON HCL 8 MG
TAKE 1 TABLET BY ORAL ROUTE EVERY 6 HOURS TABLET ORAL
Qty: 20 | Refills: 0 | Status: ACTIVE | COMMUNITY
Start: 2019-07-03

## 2024-01-17 RX ORDER — TIZANIDINE 4 MG/1
1 TABLET AS NEEDED TABLET ORAL THREE TIMES A DAY
Qty: 90 TABLET | Refills: 3 | Status: ACTIVE | COMMUNITY
Start: 2023-12-11 | End: 2024-04-18

## 2024-01-17 RX ORDER — TRAZODONE HYDROCHLORIDE 50 MG/1
TAKE 1 TABLET BY MOUTH EVERY NIGHT AT BEDTIME TABLET ORAL
Qty: 30 TABLET | Refills: 0 | Status: ACTIVE | COMMUNITY

## 2024-01-17 RX ORDER — EXTENDED PHENYTOIN SODIUM 30 MG/1
CAPSULE ORAL
Qty: 0 | Refills: 0 | Status: ACTIVE | COMMUNITY
Start: 1900-01-01

## 2024-01-17 RX ORDER — ESOMEPRAZOLE MAGNESIUM 40 MG
CAPSULE,DELAYED RELEASE (ENTERIC COATED) ORAL
Qty: 0 | Refills: 0 | Status: ACTIVE | COMMUNITY
Start: 1900-01-01

## 2024-01-17 RX ORDER — TEMAZEPAM 30 MG/1
1 CAPSULE AT BEDTIME AS NEEDED CAPSULE ORAL ONCE A DAY
Qty: 30 CAPSULE | Refills: 3 | Status: ACTIVE | COMMUNITY
Start: 2021-07-26

## 2024-01-17 NOTE — HPI-TODAY'S VISIT:
52 yo patient for abdominal pain f/u. She has been experiencing constant p-prandial abdominal fullness and early satiety w/ constant nausea w/o emesis. Unable to eat much due to her abdominal pain and p-prandial fullness, unrelated to the type of food she eats. Lately, less abdominal pain but unresolved despite diet and Nexium 40 mg po qd ac BF. EGD 11/23: GERD, bile reflux gastritis, erosive gastritis w features of treated Hp gastritis and normal duodenal bx's. Has been on PPI + diet. She has chronic migraines w N/V being followed by Neurology. She has normal bm's w/o melenic stools nor hematochezia. Seen by her PCP: normal labs x for vitamin D deficiency. She has chronic back pain due to multiple back surgeries and marked TS kyphosis and lumbar scoliosis which has been progressing. Has been seen by Ortho and suggested a back brace; not a candidate for additional surgeries. She has been on Percocet for pain control along w Tizanidine 4 mg po tid, muscle relaxant, anxiolytics for quite some time. She has been advised to follow w Pain Clinic for management of chronic pain syndrome and treatment. She has been provided w an appointment to see Pain Clinic next week. She was told that all non-GI medications, and chronic GI sxs  will be managed by me. She will be followed on a regular basis for all her GI complaints as usual. This was discussed w Ольга Rodriguez,  and Zoraida Murray in presence of patient. Patient voices her understanding of these recommendations. Has been Dx'd w chronic hand eczema being followed by Dermatologist. No fever or chills. No cardiorespiratory sxs. Recent labs all normal. Normal A + P CT scan and CXR. No other complaints.

## 2024-01-29 ENCOUNTER — TELEPHONE ENCOUNTER (OUTPATIENT)
Dept: URBAN - METROPOLITAN AREA CLINIC 98 | Facility: CLINIC | Age: 54
End: 2024-01-29

## 2024-02-11 PROBLEM — 82423001: Status: ACTIVE | Noted: 2022-11-28

## 2025-06-14 ENCOUNTER — OFFICE VISIT (OUTPATIENT)
Dept: URBAN - METROPOLITAN AREA TELEHEALTH 2 | Facility: TELEHEALTH | Age: 55
End: 2025-06-14
Payer: MEDICARE

## 2025-06-14 ENCOUNTER — OFFICE VISIT (OUTPATIENT)
Dept: URBAN - METROPOLITAN AREA TELEHEALTH 2 | Facility: TELEHEALTH | Age: 55
End: 2025-06-14

## 2025-06-14 DIAGNOSIS — R10.84 GENERALIZED ABDOMINAL PAIN: ICD-10-CM

## 2025-06-14 DIAGNOSIS — G89.29 OTHER CHRONIC PAIN: ICD-10-CM

## 2025-06-14 DIAGNOSIS — K21.9 GERD WITHOUT ESOPHAGITIS: ICD-10-CM

## 2025-06-14 DIAGNOSIS — M54.6 PAIN IN THORACIC SPINE: ICD-10-CM

## 2025-06-14 PROCEDURE — 99214 OFFICE O/P EST MOD 30 MIN: CPT | Performed by: INTERNAL MEDICINE

## 2025-06-14 RX ORDER — TEMAZEPAM 30 MG/1
1 CAPSULE AT BEDTIME AS NEEDED CAPSULE ORAL ONCE A DAY
Qty: 30 CAPSULE | Refills: 3 | Status: ACTIVE | COMMUNITY
Start: 2021-07-26

## 2025-06-14 RX ORDER — ALPRAZOLAM 2 MG/1
1 TABLET TABLET ORAL THREE TIMES A DAY
Qty: 90 TABLET | Refills: 3 | Status: ACTIVE | COMMUNITY
Start: 2020-10-19

## 2025-06-14 RX ORDER — ESOMEPRAZOLE MAGNESIUM 40 MG
CAPSULE,DELAYED RELEASE (ENTERIC COATED) ORAL
Qty: 0 | Refills: 0 | Status: ACTIVE | COMMUNITY
Start: 1900-01-01

## 2025-06-14 RX ORDER — PROMETHAZINE HYDROCHLORIDE 50 MG/1
TAKE 1 TABLET BY MOUTH THREE TIMES DAILY AS NEEDED TABLET ORAL
Qty: 90 TABLET | Refills: 0 | Status: ACTIVE | COMMUNITY

## 2025-06-14 RX ORDER — TIZANIDINE HYDROCHLORIDE 4 MG/1
TAKE 1 CAPSULE(4 MG) BY MOUTH THREE TIMES DAILY FOR 3 DAYS AS NEEDED CAPSULE ORAL
Qty: 100 CAPSULE | Refills: 0 | Status: ACTIVE | COMMUNITY

## 2025-06-14 RX ORDER — TRAZODONE HYDROCHLORIDE 50 MG/1
TAKE 1 TABLET BY MOUTH EVERY NIGHT AT BEDTIME TABLET ORAL
Qty: 30 TABLET | Refills: 0 | Status: ACTIVE | COMMUNITY

## 2025-06-14 RX ORDER — EXTENDED PHENYTOIN SODIUM 30 MG/1
CAPSULE ORAL
Qty: 0 | Refills: 0 | Status: ACTIVE | COMMUNITY
Start: 1900-01-01

## 2025-06-14 RX ORDER — ONDANSETRON HCL 8 MG
TAKE 1 TABLET BY ORAL ROUTE EVERY 6 HOURS TABLET ORAL
Qty: 20 | Refills: 0 | Status: ACTIVE | COMMUNITY
Start: 2019-07-03

## 2025-06-14 RX ORDER — IBUPROFEN 800 MG/1
TAKE 1 TABLET (800 MG) BY ORAL ROUTE 3 TIMES PER DAY WITH FOOD TABLET ORAL
Qty: 0 | Refills: 0 | Status: ACTIVE | COMMUNITY
Start: 1900-01-01

## 2025-06-14 NOTE — HPI-TODAY'S VISIT:
Patient presents for LIPID/LIVER.  Patient denies any concerns.  Patient is compliant with taking medications.     Patient taking the following Cholesterol medications:  crestor 5mg qd    Per Dr Dickey recheck again in 6 months. Verbal instruction provided to patient. Patient agreeable and  verbalized complete understanding. Patient will be needing refills at this time.        This is a Telehealth OV to which patient has agreed to. Limitations of TH discussed; patient understands and agrees to proceed. Patient is at home during this virtual OV. 53 yo patient for abdominal pain f/u. This time, she presents w c/o globus sensation, ongoing GERD sxs wo dysphagia / odynophagia, w p-prandial epigastric burning pain w " slow swallowing " wo food impaction. She has occasional nausea wo emesis. She has been complaining of orthopnea, had a nuclear XTT last week, results are pending; has cardiology follow-up in a couple of weeks. She has been experiencing constant p-prandial abdominal fullness and early satiety w/ constant nausea w/o emesis. Unable to eat much due to her abdominal pain and p-prandial fullness, unrelated to the type of food she eats. Has been on Nexium 40 mg po qd ac BF and elimination, antireflux diet.  EGD 11/23: GERD, bile reflux gastritis, erosive gastritis w features of treated Hp gastritis and normal duodenal bx's She has chronic migraines w N/V being followed by Neurology. She has normal bm's w/o melenic stools nor hematochezia. She has chronic back pain due to multiple back surgeries and marked TS kyphosis and lumbar scoliosis which has been progressing. Has been seen by Ortho and suggested a back brace; not a candidate for additional surgeries. She has been on Percocet for pain control along w Tizanidine 4 mg po tid, muscle relaxant, anxiolytics for quite some time. Followed by  Pain Clinic for management of chronic pain syndrome and treatment. No fever or chills. No other complaints.

## 2025-06-17 NOTE — PHYSICAL EXAM NEUROLOGIC:
Sandra Dhillon 1978 req refill on Adderall 20MG.   oriented to person, place and time , normal sensation , short and long term memory intact

## 2025-06-19 ENCOUNTER — TELEPHONE ENCOUNTER (OUTPATIENT)
Dept: URBAN - METROPOLITAN AREA CLINIC 98 | Facility: CLINIC | Age: 55
End: 2025-06-19

## 2025-06-19 ENCOUNTER — LAB OUTSIDE AN ENCOUNTER (OUTPATIENT)
Dept: URBAN - METROPOLITAN AREA TELEHEALTH 2 | Facility: TELEHEALTH | Age: 55
End: 2025-06-19

## 2025-06-27 ENCOUNTER — TELEPHONE ENCOUNTER (OUTPATIENT)
Dept: URBAN - METROPOLITAN AREA CLINIC 98 | Facility: CLINIC | Age: 55
End: 2025-06-27

## 2025-07-14 ENCOUNTER — OFFICE VISIT (OUTPATIENT)
Dept: URBAN - METROPOLITAN AREA SURGERY CENTER 18 | Facility: SURGERY CENTER | Age: 55
End: 2025-07-14

## 2025-07-14 RX ORDER — TRAZODONE HYDROCHLORIDE 50 MG/1
TAKE 1 TABLET BY MOUTH EVERY NIGHT AT BEDTIME TABLET ORAL
Qty: 30 TABLET | Refills: 0 | Status: ACTIVE | COMMUNITY

## 2025-07-14 RX ORDER — ONDANSETRON HCL 8 MG
TAKE 1 TABLET BY ORAL ROUTE EVERY 6 HOURS TABLET ORAL
Qty: 20 | Refills: 0 | Status: ACTIVE | COMMUNITY
Start: 2019-07-03

## 2025-07-14 RX ORDER — PROMETHAZINE HYDROCHLORIDE 50 MG/1
TAKE 1 TABLET BY MOUTH THREE TIMES DAILY AS NEEDED TABLET ORAL
Qty: 90 TABLET | Refills: 0 | Status: ACTIVE | COMMUNITY

## 2025-07-14 RX ORDER — IBUPROFEN 800 MG/1
TAKE 1 TABLET (800 MG) BY ORAL ROUTE 3 TIMES PER DAY WITH FOOD TABLET ORAL
Qty: 0 | Refills: 0 | Status: ACTIVE | COMMUNITY
Start: 1900-01-01

## 2025-07-14 RX ORDER — EXTENDED PHENYTOIN SODIUM 30 MG/1
CAPSULE ORAL
Qty: 0 | Refills: 0 | Status: ACTIVE | COMMUNITY
Start: 1900-01-01

## 2025-07-14 RX ORDER — TIZANIDINE HYDROCHLORIDE 4 MG/1
TAKE 1 CAPSULE(4 MG) BY MOUTH THREE TIMES DAILY FOR 3 DAYS AS NEEDED CAPSULE ORAL
Qty: 100 CAPSULE | Refills: 0 | Status: ACTIVE | COMMUNITY

## 2025-07-14 RX ORDER — ALPRAZOLAM 2 MG/1
1 TABLET TABLET ORAL THREE TIMES A DAY
Qty: 90 TABLET | Refills: 3 | Status: ACTIVE | COMMUNITY
Start: 2020-10-19

## 2025-07-14 RX ORDER — ESOMEPRAZOLE MAGNESIUM 40 MG
CAPSULE,DELAYED RELEASE (ENTERIC COATED) ORAL
Qty: 0 | Refills: 0 | Status: ACTIVE | COMMUNITY
Start: 1900-01-01

## 2025-07-14 RX ORDER — TEMAZEPAM 30 MG/1
1 CAPSULE AT BEDTIME AS NEEDED CAPSULE ORAL ONCE A DAY
Qty: 30 CAPSULE | Refills: 3 | Status: ACTIVE | COMMUNITY
Start: 2021-07-26

## 2025-08-20 ENCOUNTER — ERX REFILL RESPONSE (OUTPATIENT)
Dept: URBAN - METROPOLITAN AREA CLINIC 98 | Facility: CLINIC | Age: 55
End: 2025-08-20

## 2025-08-20 RX ORDER — PROMETHAZINE HYDROCHLORIDE 50 MG/1
TAKE 1 TABLET BY MOUTH THREE TIMES DAILY TABLET ORAL
Qty: 435 TABLET | Refills: 0 | OUTPATIENT